# Patient Record
Sex: FEMALE | Race: BLACK OR AFRICAN AMERICAN | Employment: STUDENT | URBAN - METROPOLITAN AREA
[De-identification: names, ages, dates, MRNs, and addresses within clinical notes are randomized per-mention and may not be internally consistent; named-entity substitution may affect disease eponyms.]

---
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
## 2023-01-21 ENCOUNTER — HOSPITAL ENCOUNTER (EMERGENCY)
Facility: HOSPITAL | Age: 18
Discharge: HOME | End: 2023-01-21
Attending: EMERGENCY MEDICINE
Payer: COMMERCIAL

## 2023-01-21 VITALS
WEIGHT: 130 LBS | HEART RATE: 80 BPM | SYSTOLIC BLOOD PRESSURE: 126 MMHG | RESPIRATION RATE: 15 BRPM | BODY MASS INDEX: 22.2 KG/M2 | OXYGEN SATURATION: 100 % | HEIGHT: 64 IN | TEMPERATURE: 98 F | DIASTOLIC BLOOD PRESSURE: 89 MMHG

## 2023-01-21 DIAGNOSIS — J06.9 UPPER RESPIRATORY TRACT INFECTION, UNSPECIFIED TYPE: Primary | ICD-10-CM

## 2023-01-21 LAB
FLUAV + FLUBV RNA SPEC NAA+PROBE: NEGATIVE
FLUAV + FLUBV RNA SPEC NAA+PROBE: NEGATIVE
RSV RNA SPEC NAA+PROBE: NEGATIVE
SARS-COV-2 RNA RESP QL NAA+PROBE: NOT DETECTED

## 2023-01-21 PROCEDURE — 99283 EMERGENCY DEPT VISIT LOW MDM: CPT

## 2023-01-21 PROCEDURE — 0241U SARS-COV-2/FLU A AND B/RSV BY PCR (GENEXPERT): CPT | Performed by: EMERGENCY MEDICINE

## 2023-01-21 NOTE — ED QUICK NOTES
PT reports nasal congestion and fatigue since Wednesday. Denies CP, SOB, KRUEGER, vomiting, nausea, blood in urine, and blood in stool.

## 2023-02-07 ENCOUNTER — OFFICE VISIT (OUTPATIENT)
Dept: FAMILY MEDICINE CLINIC | Facility: CLINIC | Age: 18
End: 2023-02-07

## 2023-02-07 ENCOUNTER — TELEPHONE (OUTPATIENT)
Dept: FAMILY MEDICINE CLINIC | Facility: CLINIC | Age: 18
End: 2023-02-07

## 2023-02-07 ENCOUNTER — LAB ENCOUNTER (OUTPATIENT)
Dept: LAB | Age: 18
End: 2023-02-07
Attending: NURSE PRACTITIONER
Payer: COMMERCIAL

## 2023-02-07 VITALS
SYSTOLIC BLOOD PRESSURE: 122 MMHG | HEIGHT: 64 IN | WEIGHT: 147 LBS | HEART RATE: 99 BPM | BODY MASS INDEX: 25.1 KG/M2 | DIASTOLIC BLOOD PRESSURE: 81 MMHG

## 2023-02-07 DIAGNOSIS — Z34.90 PREGNANCY, UNSPECIFIED GESTATIONAL AGE: Primary | ICD-10-CM

## 2023-02-07 DIAGNOSIS — N92.6 MISSED MENSES: ICD-10-CM

## 2023-02-07 LAB
B-HCG SERPL-ACNC: ABNORMAL MIU/ML
CONTROL LINE PRESENT WITH A CLEAR BACKGROUND (YES/NO): YES YES/NO
KIT LOT #: NORMAL NUMERIC
PREGNANCY TEST, URINE: POSITIVE

## 2023-02-07 PROCEDURE — 84702 CHORIONIC GONADOTROPIN TEST: CPT | Performed by: NURSE PRACTITIONER

## 2023-02-07 PROCEDURE — 3079F DIAST BP 80-89 MM HG: CPT | Performed by: NURSE PRACTITIONER

## 2023-02-07 PROCEDURE — 36415 COLL VENOUS BLD VENIPUNCTURE: CPT | Performed by: NURSE PRACTITIONER

## 2023-02-07 PROCEDURE — 81025 URINE PREGNANCY TEST: CPT | Performed by: NURSE PRACTITIONER

## 2023-02-07 PROCEDURE — 3074F SYST BP LT 130 MM HG: CPT | Performed by: NURSE PRACTITIONER

## 2023-02-07 PROCEDURE — 99214 OFFICE O/P EST MOD 30 MIN: CPT | Performed by: NURSE PRACTITIONER

## 2023-02-07 PROCEDURE — 3008F BODY MASS INDEX DOCD: CPT | Performed by: NURSE PRACTITIONER

## 2023-02-07 NOTE — PATIENT INSTRUCTIONS
New Pregnancy:    Prenatal vitamins-1 tablet once per day (may take at night if nausea/vomitting are a problem)    Avoid alcohol, smoking, second/ smoke, drugs    Avoid raw meats, undercooked meats, raw seafood, unpasteurized dairy products, soft cheeses    Warm deli meats to decrease risk of listeria infection    Do not take any over the counter medications without consulting health care provider first    Report any vaginal spotting or bleeding    Call if any questions or concern

## 2023-02-07 NOTE — TELEPHONE ENCOUNTER
Called and discussed natural remedies with patient and informed that antiemetics are not recommended this early in pregnancy. If symptoms persist, should follow up with ob for recommendations. Understanding verbalized.

## 2023-02-07 NOTE — TELEPHONE ENCOUNTER
Patient was just seen in the office and forgot to ask Richmond Novoa if she can prescribed Rx for nauseas   Please advs

## 2023-02-11 ENCOUNTER — PATIENT MESSAGE (OUTPATIENT)
Dept: FAMILY MEDICINE CLINIC | Facility: CLINIC | Age: 18
End: 2023-02-11

## 2023-02-11 NOTE — TELEPHONE ENCOUNTER
From: Marylou Marin  To: JOHNSON Fajardo  Sent: 2/11/2023 12:08 AM CST  Subject: hcg     good evening.  i was just wondering what did the test results mean for my blood and urine test? or should i schedule another appointment to come in ?

## 2023-02-17 ENCOUNTER — TELEPHONE (OUTPATIENT)
Dept: FAMILY MEDICINE CLINIC | Facility: CLINIC | Age: 18
End: 2023-02-17

## 2023-02-17 NOTE — TELEPHONE ENCOUNTER
Patient contacts clinic reporting recent visit for confirmed pregnancy. States she was not feeling well today and needs a note for work. Appointment noted 2/7, 10 days ago. I advised patient if she is experiencing new symptoms she would need to be seen for evaluation and documentation of work status. She reports chills. Has not checked her temperature. She denies fever, body aches, congestion, sore throat, coughing, wheezing, chest pain or shortness of breath. Advised patient try [facility] or  for evaluation and documentation. She will do so.

## 2023-02-27 ENCOUNTER — HOSPITAL ENCOUNTER (EMERGENCY)
Facility: HOSPITAL | Age: 18
Discharge: HOME | End: 2023-02-27
Attending: EMERGENCY MEDICINE
Payer: COMMERCIAL

## 2023-02-27 VITALS
BODY MASS INDEX: 23.9 KG/M2 | WEIGHT: 140 LBS | HEART RATE: 88 BPM | HEIGHT: 64 IN | TEMPERATURE: 98 F | RESPIRATION RATE: 18 BRPM | DIASTOLIC BLOOD PRESSURE: 72 MMHG | SYSTOLIC BLOOD PRESSURE: 119 MMHG | OXYGEN SATURATION: 99 %

## 2023-02-27 DIAGNOSIS — R10.2 PELVIC PAIN: ICD-10-CM

## 2023-02-27 DIAGNOSIS — O03.9 MISCARRIAGE: Primary | ICD-10-CM

## 2023-02-27 PROCEDURE — 99283 EMERGENCY DEPT VISIT LOW MDM: CPT

## 2023-02-27 PROCEDURE — 99285 EMERGENCY DEPT VISIT HI MDM: CPT

## 2023-02-27 RX ORDER — HYDROCODONE BITARTRATE AND ACETAMINOPHEN 5; 325 MG/1; MG/1
1-2 TABLET ORAL EVERY 6 HOURS PRN
Qty: 10 TABLET | Refills: 0 | Status: SHIPPED | OUTPATIENT
Start: 2023-02-27 | End: 2023-03-04

## 2023-02-27 RX ORDER — HYDROCODONE BITARTRATE AND ACETAMINOPHEN 5; 325 MG/1; MG/1
2 TABLET ORAL ONCE
Status: COMPLETED | OUTPATIENT
Start: 2023-02-27 | End: 2023-02-27

## 2023-02-27 NOTE — ED INITIAL ASSESSMENT (HPI)
Pt presents with lower abdominal pain and vaginal bleeding that got worse since Saturday, pt reports she took an \"\" pill on Tuesday

## 2023-03-04 ENCOUNTER — MED REC SCAN ONLY (OUTPATIENT)
Dept: FAMILY MEDICINE CLINIC | Facility: CLINIC | Age: 18
End: 2023-03-04

## 2023-04-18 ENCOUNTER — PATIENT MESSAGE (OUTPATIENT)
Dept: FAMILY MEDICINE CLINIC | Facility: CLINIC | Age: 18
End: 2023-04-18

## 2023-04-28 ENCOUNTER — HOSPITAL ENCOUNTER (EMERGENCY)
Facility: HOSPITAL | Age: 18
Discharge: HOME | End: 2023-04-28
Attending: STUDENT IN AN ORGANIZED HEALTH CARE EDUCATION/TRAINING PROGRAM
Payer: COMMERCIAL

## 2023-04-28 VITALS
SYSTOLIC BLOOD PRESSURE: 120 MMHG | OXYGEN SATURATION: 98 % | DIASTOLIC BLOOD PRESSURE: 69 MMHG | WEIGHT: 140 LBS | TEMPERATURE: 98 F | HEIGHT: 64 IN | HEART RATE: 65 BPM | RESPIRATION RATE: 18 BRPM | BODY MASS INDEX: 23.9 KG/M2

## 2023-04-28 DIAGNOSIS — M54.9 MILD BACK PAIN: Primary | ICD-10-CM

## 2023-04-28 DIAGNOSIS — G44.209 TENSION HEADACHE: ICD-10-CM

## 2023-04-28 PROCEDURE — 96372 THER/PROPH/DIAG INJ SC/IM: CPT

## 2023-04-28 PROCEDURE — 99283 EMERGENCY DEPT VISIT LOW MDM: CPT

## 2023-04-28 PROCEDURE — 99284 EMERGENCY DEPT VISIT MOD MDM: CPT

## 2023-04-28 RX ORDER — KETOROLAC TROMETHAMINE 15 MG/ML
15 INJECTION, SOLUTION INTRAMUSCULAR; INTRAVENOUS ONCE
Status: COMPLETED | OUTPATIENT
Start: 2023-04-28 | End: 2023-04-28

## 2023-04-28 RX ORDER — IBUPROFEN 600 MG/1
600 TABLET ORAL EVERY 8 HOURS PRN
Qty: 21 TABLET | Refills: 0 | Status: SHIPPED | OUTPATIENT
Start: 2023-04-28 | End: 2023-05-05

## 2023-04-28 RX ORDER — ACETAMINOPHEN 500 MG
1000 TABLET ORAL ONCE
Status: COMPLETED | OUTPATIENT
Start: 2023-04-28 | End: 2023-04-28

## 2023-04-28 NOTE — DISCHARGE INSTRUCTIONS
.Thank you for seeking care at [facility] Emergency Department. You have been seen and evaluated for back pain. We discussed the results of your workup   Please read the instructions provided   If given prescriptions, take as instructed    Remember, your care process does not end after your visit today. Please follow-up with your doctor within 1-2 days for a follow-up check to ensure you are  improving, to see if you need any further evaluation/testing, or to evaluate for any alternate diagnoses. Please return to the emergency department if you develop severe pain that is not controlled by pain medications, loss of control of your legs, if you are unable to walk because of pain or weakness, worsening numbness or weakness, loss of bowel or bladder control (dribbling of urine or having accidents you wouldn't normally have), inability to urinate, numbness of your genital or anal area, fevers, abdominal pain, change in urination, or as these could all be signs of a serious medical emergency. Call or return to the ER for any other new or worsening symptoms.

## 2023-05-13 ENCOUNTER — PATIENT MESSAGE (OUTPATIENT)
Dept: FAMILY MEDICINE CLINIC | Facility: CLINIC | Age: 18
End: 2023-05-13

## 2023-05-13 ENCOUNTER — TELEPHONE (OUTPATIENT)
Dept: FAMILY MEDICINE CLINIC | Facility: CLINIC | Age: 18
End: 2023-05-13

## 2023-05-13 NOTE — TELEPHONE ENCOUNTER
Yelena Feliciano - unsure if you would write a note, please advise. See also 5/13/23 Patient Message \"doctors note\"  Last visit 2/7/23. Duplicate Encounter. See 5/13/23 Patient message \"doctors note\" encounter from today. Another [facility] RN had already responded to patient's request, sent MyChart to patient.

## 2023-06-02 ENCOUNTER — HOSPITAL ENCOUNTER (EMERGENCY)
Facility: HOSPITAL | Age: 18
Discharge: HOME | End: 2023-06-02
Attending: EMERGENCY MEDICINE
Payer: COMMERCIAL

## 2023-06-02 VITALS
OXYGEN SATURATION: 98 % | RESPIRATION RATE: 18 BRPM | HEIGHT: 64 IN | DIASTOLIC BLOOD PRESSURE: 68 MMHG | BODY MASS INDEX: 24.75 KG/M2 | WEIGHT: 145 LBS | TEMPERATURE: 98 F | SYSTOLIC BLOOD PRESSURE: 123 MMHG | HEART RATE: 85 BPM

## 2023-06-02 DIAGNOSIS — J06.9 VIRAL URI WITH COUGH: Primary | ICD-10-CM

## 2023-06-02 LAB — SARS-COV-2 RNA RESP QL NAA+PROBE: NOT DETECTED

## 2023-06-02 PROCEDURE — 99283 EMERGENCY DEPT VISIT LOW MDM: CPT

## 2023-06-02 NOTE — ED INITIAL ASSESSMENT (HPI)
Patient presents with cough/congestion/ loss of taste and smell since Wednesday   Patient notes body aches

## 2023-08-04 ENCOUNTER — OFFICE VISIT (OUTPATIENT)
Dept: FAMILY MEDICINE CLINIC | Facility: CLINIC | Age: 18
End: 2023-08-04

## 2023-08-04 VITALS
DIASTOLIC BLOOD PRESSURE: 68 MMHG | WEIGHT: 150 LBS | HEART RATE: 71 BPM | BODY MASS INDEX: 25.61 KG/M2 | SYSTOLIC BLOOD PRESSURE: 107 MMHG | HEIGHT: 64 IN

## 2023-08-04 DIAGNOSIS — K92.1 BLOOD IN STOOL: Primary | ICD-10-CM

## 2023-08-04 PROCEDURE — 3008F BODY MASS INDEX DOCD: CPT | Performed by: NURSE PRACTITIONER

## 2023-08-04 PROCEDURE — 3074F SYST BP LT 130 MM HG: CPT | Performed by: NURSE PRACTITIONER

## 2023-08-04 PROCEDURE — 99214 OFFICE O/P EST MOD 30 MIN: CPT | Performed by: NURSE PRACTITIONER

## 2023-08-04 PROCEDURE — 3078F DIAST BP <80 MM HG: CPT | Performed by: NURSE PRACTITIONER

## 2023-09-01 PROCEDURE — 99284 EMERGENCY DEPT VISIT MOD MDM: CPT

## 2023-09-01 PROCEDURE — 99283 EMERGENCY DEPT VISIT LOW MDM: CPT

## 2023-09-02 ENCOUNTER — HOSPITAL ENCOUNTER (EMERGENCY)
Facility: HOSPITAL | Age: 18
Discharge: HOME | End: 2023-09-02
Attending: EMERGENCY MEDICINE
Payer: COMMERCIAL

## 2023-09-02 VITALS
HEART RATE: 65 BPM | DIASTOLIC BLOOD PRESSURE: 67 MMHG | TEMPERATURE: 98 F | OXYGEN SATURATION: 99 % | RESPIRATION RATE: 18 BRPM | WEIGHT: 140 LBS | HEIGHT: 64 IN | SYSTOLIC BLOOD PRESSURE: 134 MMHG | BODY MASS INDEX: 23.9 KG/M2

## 2023-09-02 DIAGNOSIS — K92.2 GASTROINTESTINAL HEMORRHAGE, UNSPECIFIED GASTROINTESTINAL HEMORRHAGE TYPE: Primary | ICD-10-CM

## 2023-09-02 LAB — B-HCG UR QL: NEGATIVE

## 2023-09-02 PROCEDURE — 81025 URINE PREGNANCY TEST: CPT

## 2023-09-02 RX ORDER — POLYETHYLENE GLYCOL 3350 17 G/17G
17 POWDER, FOR SOLUTION ORAL DAILY PRN
Qty: 12 EACH | Refills: 0 | Status: SHIPPED | OUTPATIENT
Start: 2023-09-02 | End: 2023-10-02

## 2023-09-02 RX ORDER — DOCUSATE SODIUM 100 MG/1
100 CAPSULE, LIQUID FILLED ORAL 2 TIMES DAILY
Qty: 60 CAPSULE | Refills: 0 | Status: SHIPPED | OUTPATIENT
Start: 2023-09-02 | End: 2023-10-02

## 2023-09-02 NOTE — ED INITIAL ASSESSMENT (HPI)
Pt to ED with c/o blood in stool and mild rectal pain. Pt states Hx of hemorrhoids about 3 years ago.  No abdominal pain or n/v/d.

## 2023-12-12 ENCOUNTER — OFFICE VISIT (OUTPATIENT)
Dept: FAMILY MEDICINE CLINIC | Facility: CLINIC | Age: 18
End: 2023-12-12
Payer: COMMERCIAL

## 2023-12-12 VITALS
WEIGHT: 152 LBS | SYSTOLIC BLOOD PRESSURE: 110 MMHG | DIASTOLIC BLOOD PRESSURE: 71 MMHG | BODY MASS INDEX: 25.95 KG/M2 | HEART RATE: 76 BPM | HEIGHT: 64 IN

## 2023-12-12 DIAGNOSIS — H91.91 HEARING LOSS OF RIGHT EAR, UNSPECIFIED HEARING LOSS TYPE: Primary | ICD-10-CM

## 2023-12-12 PROCEDURE — 99213 OFFICE O/P EST LOW 20 MIN: CPT | Performed by: NURSE PRACTITIONER

## 2023-12-12 PROCEDURE — 3008F BODY MASS INDEX DOCD: CPT | Performed by: NURSE PRACTITIONER

## 2023-12-12 PROCEDURE — 3078F DIAST BP <80 MM HG: CPT | Performed by: NURSE PRACTITIONER

## 2023-12-12 PROCEDURE — 3074F SYST BP LT 130 MM HG: CPT | Performed by: NURSE PRACTITIONER

## 2024-07-25 ENCOUNTER — HOSPITAL ENCOUNTER (EMERGENCY)
Facility: HOSPITAL | Age: 19
Discharge: HOME | End: 2024-07-25
Attending: EMERGENCY MEDICINE
Payer: MEDICAID

## 2024-07-25 ENCOUNTER — APPOINTMENT (OUTPATIENT)
Dept: CT IMAGING | Facility: HOSPITAL | Age: 19
End: 2024-07-25
Attending: EMERGENCY MEDICINE
Payer: MEDICAID

## 2024-07-25 VITALS
SYSTOLIC BLOOD PRESSURE: 118 MMHG | OXYGEN SATURATION: 99 % | HEART RATE: 88 BPM | DIASTOLIC BLOOD PRESSURE: 86 MMHG | BODY MASS INDEX: 26 KG/M2 | WEIGHT: 150 LBS | RESPIRATION RATE: 16 BRPM | TEMPERATURE: 99 F

## 2024-07-25 DIAGNOSIS — N12 PYELONEPHRITIS OF LEFT KIDNEY: Primary | ICD-10-CM

## 2024-07-25 LAB
ANION GAP SERPL CALC-SCNC: 9 MMOL/L (ref 0–18)
ATRIAL RATE: 88 BPM
B-HCG SERPL-ACNC: <2.6 MIU/ML
B-HCG UR QL: NEGATIVE
BASOPHILS # BLD AUTO: 0.03 X10(3) UL (ref 0–0.2)
BASOPHILS NFR BLD AUTO: 0.2 %
BILIRUB UR QL: NEGATIVE
BUN BLD-MCNC: 9 MG/DL (ref 9–23)
BUN/CREAT SERPL: 9.5 (ref 10–20)
CALCIUM BLD-MCNC: 9.8 MG/DL (ref 8.7–10.4)
CHLORIDE SERPL-SCNC: 107 MMOL/L (ref 98–112)
CO2 SERPL-SCNC: 25 MMOL/L (ref 21–32)
COLOR UR: YELLOW
CREAT BLD-MCNC: 0.95 MG/DL
DEPRECATED RDW RBC AUTO: 44.3 FL (ref 35.1–46.3)
EGFRCR SERPLBLD CKD-EPI 2021: 87 ML/MIN/1.73M2 (ref 60–?)
EOSINOPHIL # BLD AUTO: 0.09 X10(3) UL (ref 0–0.7)
EOSINOPHIL NFR BLD AUTO: 0.7 %
ERYTHROCYTE [DISTWIDTH] IN BLOOD BY AUTOMATED COUNT: 13.2 % (ref 11–15)
FLUAV + FLUBV RNA SPEC NAA+PROBE: NEGATIVE
FLUAV + FLUBV RNA SPEC NAA+PROBE: NEGATIVE
GLUCOSE BLD-MCNC: 88 MG/DL (ref 70–99)
GLUCOSE UR-MCNC: NEGATIVE MG/DL
HCT VFR BLD AUTO: 42.1 %
HGB BLD-MCNC: 13.7 G/DL
IMM GRANULOCYTES # BLD AUTO: 0.03 X10(3) UL (ref 0–1)
IMM GRANULOCYTES NFR BLD: 0.2 %
KETONES UR-MCNC: NEGATIVE MG/DL
LYMPHOCYTES # BLD AUTO: 1.06 X10(3) UL (ref 1–4)
LYMPHOCYTES NFR BLD AUTO: 8.8 %
MCH RBC QN AUTO: 29.6 PG (ref 26–34)
MCHC RBC AUTO-ENTMCNC: 32.5 G/DL (ref 31–37)
MCV RBC AUTO: 90.9 FL
MONOCYTES # BLD AUTO: 0.1 X10(3) UL (ref 0.1–1)
MONOCYTES NFR BLD AUTO: 0.8 %
NEUTROPHILS # BLD AUTO: 10.75 X10 (3) UL (ref 1.5–7.7)
NEUTROPHILS # BLD AUTO: 10.75 X10(3) UL (ref 1.5–7.7)
NEUTROPHILS NFR BLD AUTO: 89.3 %
NITRITE UR QL STRIP.AUTO: NEGATIVE
OSMOLALITY SERPL CALC.SUM OF ELEC: 290 MOSM/KG (ref 275–295)
P AXIS: 40 DEGREES
P-R INTERVAL: 122 MS
PH UR: 6 [PH] (ref 5–8)
PLATELET # BLD AUTO: 294 10(3)UL (ref 150–450)
POTASSIUM SERPL-SCNC: 3.6 MMOL/L (ref 3.5–5.1)
Q-T INTERVAL: 364 MS
QRS DURATION: 100 MS
QTC CALCULATION (BEZET): 440 MS
R AXIS: 91 DEGREES
RBC # BLD AUTO: 4.63 X10(6)UL
RBC #/AREA URNS AUTO: >10 /HPF
RBC #/AREA URNS AUTO: >10 /HPF
RSV RNA SPEC NAA+PROBE: NEGATIVE
SARS-COV-2 RNA RESP QL NAA+PROBE: NOT DETECTED
SODIUM SERPL-SCNC: 141 MMOL/L (ref 136–145)
SP GR UR STRIP: 1.01 (ref 1–1.03)
T AXIS: 45 DEGREES
UROBILINOGEN UR STRIP-ACNC: 0.2
VENTRICULAR RATE: 88 BPM
WBC # BLD AUTO: 12.1 X10(3) UL (ref 4–11)
WBC #/AREA URNS AUTO: >50 /HPF
WBC #/AREA URNS AUTO: >50 /HPF
WBC CLUMPS UR QL AUTO: PRESENT /HPF
WBC CLUMPS UR QL AUTO: PRESENT /HPF

## 2024-07-25 PROCEDURE — 96375 TX/PRO/DX INJ NEW DRUG ADDON: CPT

## 2024-07-25 PROCEDURE — 87086 URINE CULTURE/COLONY COUNT: CPT | Performed by: EMERGENCY MEDICINE

## 2024-07-25 PROCEDURE — 84702 CHORIONIC GONADOTROPIN TEST: CPT | Performed by: EMERGENCY MEDICINE

## 2024-07-25 PROCEDURE — 85025 COMPLETE CBC W/AUTO DIFF WBC: CPT | Performed by: EMERGENCY MEDICINE

## 2024-07-25 PROCEDURE — 87088 URINE BACTERIA CULTURE: CPT | Performed by: EMERGENCY MEDICINE

## 2024-07-25 PROCEDURE — 99285 EMERGENCY DEPT VISIT HI MDM: CPT

## 2024-07-25 PROCEDURE — 99284 EMERGENCY DEPT VISIT MOD MDM: CPT

## 2024-07-25 PROCEDURE — 74176 CT ABD & PELVIS W/O CONTRAST: CPT | Performed by: EMERGENCY MEDICINE

## 2024-07-25 PROCEDURE — 81001 URINALYSIS AUTO W/SCOPE: CPT | Performed by: EMERGENCY MEDICINE

## 2024-07-25 PROCEDURE — 93010 ELECTROCARDIOGRAM REPORT: CPT

## 2024-07-25 PROCEDURE — 0241U SARS-COV-2/FLU A AND B/RSV BY PCR (GENEXPERT): CPT | Performed by: EMERGENCY MEDICINE

## 2024-07-25 PROCEDURE — 81025 URINE PREGNANCY TEST: CPT

## 2024-07-25 PROCEDURE — 80048 BASIC METABOLIC PNL TOTAL CA: CPT | Performed by: EMERGENCY MEDICINE

## 2024-07-25 PROCEDURE — 96361 HYDRATE IV INFUSION ADD-ON: CPT

## 2024-07-25 PROCEDURE — 96374 THER/PROPH/DIAG INJ IV PUSH: CPT

## 2024-07-25 PROCEDURE — 87186 SC STD MICRODIL/AGAR DIL: CPT | Performed by: EMERGENCY MEDICINE

## 2024-07-25 PROCEDURE — 93005 ELECTROCARDIOGRAM TRACING: CPT

## 2024-07-25 RX ORDER — ONDANSETRON 2 MG/ML
INJECTION INTRAMUSCULAR; INTRAVENOUS
Status: COMPLETED
Start: 2024-07-25 | End: 2024-07-25

## 2024-07-25 RX ORDER — CEPHALEXIN 500 MG/1
500 CAPSULE ORAL 4 TIMES DAILY
Qty: 28 CAPSULE | Refills: 0 | Status: SHIPPED | OUTPATIENT
Start: 2024-07-25 | End: 2024-08-01

## 2024-07-25 RX ORDER — AMOXICILLIN AND CLAVULANATE POTASSIUM 875; 125 MG/1; MG/1
875 TABLET, FILM COATED ORAL ONCE
Status: COMPLETED | OUTPATIENT
Start: 2024-07-25 | End: 2024-07-25

## 2024-07-25 RX ORDER — ONDANSETRON 2 MG/ML
4 INJECTION INTRAMUSCULAR; INTRAVENOUS ONCE
Status: COMPLETED | OUTPATIENT
Start: 2024-07-25 | End: 2024-07-25

## 2024-07-25 RX ORDER — ACETAMINOPHEN 500 MG
1000 TABLET ORAL ONCE
Status: COMPLETED | OUTPATIENT
Start: 2024-07-25 | End: 2024-07-25

## 2024-07-25 RX ORDER — ONDANSETRON 8 MG/1
8 TABLET, ORALLY DISINTEGRATING ORAL EVERY 4 HOURS PRN
Qty: 6 TABLET | Refills: 0 | Status: SHIPPED | OUTPATIENT
Start: 2024-07-25

## 2024-07-25 RX ORDER — KETOROLAC TROMETHAMINE 15 MG/ML
15 INJECTION, SOLUTION INTRAMUSCULAR; INTRAVENOUS ONCE
Status: COMPLETED | OUTPATIENT
Start: 2024-07-25 | End: 2024-07-25

## 2024-07-25 NOTE — ED INITIAL ASSESSMENT (HPI)
Patient arrives to the ER complaining of lower back pain that started a couple days ago. No dysuria. +chills, aches, and flu-like symptoms. Patient also endorses localized head pain.

## 2024-07-25 NOTE — ED QUICK NOTES
Rounding Completed    Plan of Care reviewed. Waiting for results/disposition.            7/25/2024     6:19 AM 7/25/2024     8:28 AM   Vitals History   /89 118/86   Pulse 90 88   Resp 18 16   Temp 98.6 °F (37 °C)    SpO2 99 % 99 %   Weight 150 lbs    BMI 25.75 kg/m2          Bed is locked and in lowest position. Call light within reach.

## 2024-07-25 NOTE — ED PROVIDER NOTES
Patient Seen in: [facility] Emergency Department    History     Chief Complaint   Patient presents with    Low Back Pain       HPI    19-year-old female who presents to the emergency department given left flank pain for the last 3 days associated with chills, dysuria.  No fevers.  Mild nausea.  No emesis or chest pain or dyspnea.  Denies any recent immobilization, surgery, unilateral lower extremity edema, known cancer history, or recent travel. No hx of DVT or PE.       History reviewed.   Past Medical History:    Recurrent UTI       History reviewed. History reviewed. No pertinent surgical history.      Medications :  (Not in a hospital admission)       History reviewed. No pertinent family history.    Smoking Status:   Social History     Socioeconomic History    Marital status: Single   Tobacco Use    Smoking status: Never    Smokeless tobacco: Never   Vaping Use    Vaping status: Never Used   Substance and Sexual Activity    Alcohol use: Never    Drug use: Yes     Types: Cannabis       Constitutional and vital signs reviewed.      Social History and Family History elements reviewed from today, pertinent positives to the presenting problem noted.    Physical Exam     ED Triage Vitals   BP 07/25/24 0619 122/89   Pulse 07/25/24 0619 90   Resp 07/25/24 0619 18   Temp 07/25/24 0619 98.6 °F (37 °C)   Temp src --    SpO2 07/25/24 0619 99 %   O2 Device 07/25/24 0828 None (Room air)       All measures to prevent infection transmission during my interaction with the patient were taken. The patient was already wearing a droplet mask on my arrival to the room. Personal protective equipment was worn throughout the duration of the exam.  Handwashing was performed prior to and after the exam.  Stethoscope and any equipment used during my examination was cleaned with super sani-cloth germicidal wipes following the exam.     Physical Exam    General: NAD  Head: Normocephalic and atraumatic.  Mouth/Throat/Ears/Nose:  Oropharynx is clear   Eyes: Conjunctivae and EOM are normal.   Neck: Normal range of motion. Supple.   Cardiovascular: Normal rate, regular rhythm, normal heart sounds.  Respiratory/Chest: Clear and equal bilaterally. Exhibits no tenderness.  Gastrointestinal: Soft, L CVA ttp, non-distended. Bowel sounds are normal.   Musculoskeletal:No swelling or deformity.   Neurological: Alert and appropriate. No focal deficits.   Skin: Skin is warm and dry. No pallor.  Psychiatric: Has a normal mood and affect.      ED Course        Labs Reviewed   BASIC METABOLIC PANEL (8) - Abnormal; Notable for the following components:       Result Value    BUN/CREA Ratio 9.5 (*)     All other components within normal limits   URINALYSIS WITH CULTURE REFLEX - Abnormal; Notable for the following components:    Clarity Urine Cloudy (*)     Blood Urine Large (*)     Protein Urine 30 mg/dL (*)     Leukocyte Esterase Urine Large (*)     WBC Urine >50 (*)     RBC Urine >10 (*)     Bacteria Urine 2+ (*)     Squamous Epi. Cells Few (*)     WBC Clump Present (*)     All other components within normal limits    Narrative:     Urine Culture Recommended   UA MICROSCOPIC ONLY, URINE - Abnormal; Notable for the following components:    WBC Urine >50 (*)     RBC Urine >10 (*)     Bacteria Urine 2+ (*)     Squamous Epi. Cells Few (*)     WBC Clump Present (*)     All other components within normal limits   CBC W/ DIFFERENTIAL - Abnormal; Notable for the following components:    WBC 12.1 (*)     Neutrophil Absolute Prelim 10.75 (*)     Neutrophil Absolute 10.75 (*)     All other components within normal limits   HCG, BETA SUBUNIT (QUANT PREGNANCY TEST) - Normal   POCT PREGNANCY URINE - Normal   SARS-COV-2/FLU A AND B/RSV BY PCR (GENEXPERT) - Normal    Narrative:     This test is intended for the qualitative detection and differentiation of SARS-CoV-2, influenza A, influenza B, and respiratory syncytial virus (RSV) viral RNA in nasopharyngeal or nares swabs  from individuals suspected of respiratory viral infection consistent with COVID-19 by their healthcare provider. Signs and symptoms of respiratory viral infection due to SARS-CoV-2, influenza, and RSV can be similar.                                    Test performed using the Xpert Xpress SARS-CoV-2/FLU/RSV (real time RT-PCR)  assay on the GeneXpert instrument, [employer], [place] [address].                   This test is being used under the Food and Drug Administration's Emergency Use Authorization.                                    The authorized Fact Sheet for Healthcare Providers for this assay is available upon request from the laboratory.   CBC WITH DIFFERENTIAL WITH PLATELET    Narrative:     The following orders were created for panel order CBC With Differential With Platelet.                  Procedure                               Abnormality         Status                                     ---------                               -----------         ------                                     CBC W/ DIFFERENTIAL[[id number]]          Abnormal            Final result                                                 Please view results for these tests on the individual orders.   URINE CULTURE, ROUTINE     EKG    Rate, intervals and axes as noted on EKG Report.  Rate: 88  Rhythm: Sinus Rhythm  Reading: No STEMI.  This is my interpretation.           As Interpreted by me    Imaging Results Available and Reviewed while in ED: CT ABDOMEN+PELVIS(CPT=74176)    Result Date: 7/25/2024  CONCLUSION:   No obstructing renal stone or hydroureteronephrosis.  Large amount of stool throughout the colon, suggesting constipation.  Small amount free fluid within the pelvis, which can be a physiologic finding in a patient of this age.    Dictated by (CST): Kathie Larsen MD on 7/25/2024 at 8:20 AM     Finalized by (CST): Kathie Larsen MD on 7/25/2024 at 8:24 AM         ED Medications Administered:   Medications   sodium  chloride 0.9 % IV bolus 1,000 mL (0 mL Intravenous Stopped 7/25/24 0832)   acetaminophen (Tylenol Extra Strength) tab 1,000 mg (1,000 mg Oral Given by Other 7/25/24 0700)   ondansetron (Zofran) 4 MG/2ML injection 4 mg (4 mg Intravenous Given 7/25/24 0729)   ketorolac (Toradol) 15 MG/ML injection 15 mg (15 mg Intravenous Given 7/25/24 0825)   amoxicillin clavulanate (Augmentin) 875-125 MG per tab 875 mg (875 mg Oral Given 7/25/24 0825)         MDM     Vitals:    07/25/24 0619 07/25/24 0828   BP: 122/89 118/86   Pulse: 90 88   Resp: 18 16   Temp: 98.6 °F (37 °C)    SpO2: 99% 99%   Weight: 68 kg      *I personally reviewed and interpreted all ED vitals.    Pulse Ox: 99%, Room air, Normal     Monitor Interpretation:   normal sinus rhythm as interpreted by me.  The cardiac monitor was ordered given the left flank pain.      Medical Decision Making      Differential Diagnosis/ Diagnostic Considerations: Pyelonephritis, urinary tract infection, renal calculus, COVID-19    Complicating Factors: The patient already has does not have any pertinent problems on file. to contribute to the complexity of this ED evaluation.    I reviewed prior chart records including ED visit note from September 2, 2023.  Patient is here with left flank pain, concerning for pyelonephritis.  Labs reviewed, unremarkable for acute findings aside from urinary tract infection.  Given significant microscopic hematuria, CT scan was obtained to assess for renal calculus which was unremarkable for calculus on my interpretation.  She is tolerating p.o. after supportive care, first dose of antibiotics provided in the emergency department and a prescription of antibiotics also provided.    Discharge in stable condition.  Patient is comfortable with the plan.    Prescriptions: Cephalexin, Zofran      Disposition and Plan     Clinical Impression:  1. Pyelonephritis of left kidney        Disposition:  Discharge    Follow-up:  Nonstaff, Physician    Follow  up      Rob Siegel MD  [address] [facility] [address]  [phone]    Schedule an appointment as soon as possible for a visit in 1 day(s)        Medications Prescribed:  Discharge Medication List as of 7/25/2024  8:34 AM        START taking these medications    Details   cephalexin 500 MG Oral Cap Take 1 capsule (500 mg total) by mouth 4 (four) times daily for 7 days., Normal, Disp-28 capsule, R-0      ondansetron 8 MG Oral Tablet Dispersible Take 1 tablet (8 mg total) by mouth every 4 (four) hours as needed for Nausea., Normal, Disp-6 tablet, R-0

## 2024-07-26 ENCOUNTER — HOSPITAL ENCOUNTER (INPATIENT)
Facility: HOSPITAL | Age: 19
LOS: 1 days | Discharge: HOME | End: 2024-07-28
Attending: EMERGENCY MEDICINE | Admitting: STUDENT IN AN ORGANIZED HEALTH CARE EDUCATION/TRAINING PROGRAM
Payer: MEDICAID

## 2024-07-26 DIAGNOSIS — N12 PYELONEPHRITIS: Primary | ICD-10-CM

## 2024-07-26 LAB
ANION GAP SERPL CALC-SCNC: 7 MMOL/L (ref 0–18)
BASOPHILS # BLD AUTO: 0.03 X10(3) UL (ref 0–0.2)
BASOPHILS NFR BLD AUTO: 0.2 %
BUN BLD-MCNC: 7 MG/DL (ref 9–23)
BUN/CREAT SERPL: 7.7 (ref 10–20)
CALCIUM BLD-MCNC: 9.8 MG/DL (ref 8.7–10.4)
CHLORIDE SERPL-SCNC: 105 MMOL/L (ref 98–112)
CO2 SERPL-SCNC: 26 MMOL/L (ref 21–32)
CREAT BLD-MCNC: 0.91 MG/DL
D DIMER PPP FEU-MCNC: 0.44 UG/ML FEU (ref ?–0.5)
DEPRECATED RDW RBC AUTO: 43 FL (ref 35.1–46.3)
EGFRCR SERPLBLD CKD-EPI 2021: 92 ML/MIN/1.73M2 (ref 60–?)
EOSINOPHIL # BLD AUTO: 0.01 X10(3) UL (ref 0–0.7)
EOSINOPHIL NFR BLD AUTO: 0.1 %
ERYTHROCYTE [DISTWIDTH] IN BLOOD BY AUTOMATED COUNT: 13.1 % (ref 11–15)
GLUCOSE BLD-MCNC: 99 MG/DL (ref 70–99)
HCT VFR BLD AUTO: 41.2 %
HGB BLD-MCNC: 13.5 G/DL
IMM GRANULOCYTES # BLD AUTO: 0.04 X10(3) UL (ref 0–1)
IMM GRANULOCYTES NFR BLD: 0.3 %
LACTATE SERPL-SCNC: 1.3 MMOL/L (ref 0.5–2)
LYMPHOCYTES # BLD AUTO: 1.3 X10(3) UL (ref 1–4)
LYMPHOCYTES NFR BLD AUTO: 8.8 %
MCH RBC QN AUTO: 29.6 PG (ref 26–34)
MCHC RBC AUTO-ENTMCNC: 32.8 G/DL (ref 31–37)
MCV RBC AUTO: 90.4 FL
MONOCYTES # BLD AUTO: 1.17 X10(3) UL (ref 0.1–1)
MONOCYTES NFR BLD AUTO: 7.9 %
NEUTROPHILS # BLD AUTO: 12.29 X10 (3) UL (ref 1.5–7.7)
NEUTROPHILS # BLD AUTO: 12.29 X10(3) UL (ref 1.5–7.7)
NEUTROPHILS NFR BLD AUTO: 82.7 %
OSMOLALITY SERPL CALC.SUM OF ELEC: 284 MOSM/KG (ref 275–295)
PLATELET # BLD AUTO: 293 10(3)UL (ref 150–450)
POTASSIUM SERPL-SCNC: 3.3 MMOL/L (ref 3.5–5.1)
RBC # BLD AUTO: 4.56 X10(6)UL
SODIUM SERPL-SCNC: 138 MMOL/L (ref 136–145)
WBC # BLD AUTO: 14.8 X10(3) UL (ref 4–11)

## 2024-07-26 PROCEDURE — 99223 1ST HOSP IP/OBS HIGH 75: CPT | Performed by: HOSPITALIST

## 2024-07-26 RX ORDER — KETOROLAC TROMETHAMINE 15 MG/ML
15 INJECTION, SOLUTION INTRAMUSCULAR; INTRAVENOUS ONCE
Status: COMPLETED | OUTPATIENT
Start: 2024-07-26 | End: 2024-07-26

## 2024-07-26 RX ORDER — ONDANSETRON 2 MG/ML
4 INJECTION INTRAMUSCULAR; INTRAVENOUS ONCE
Status: COMPLETED | OUTPATIENT
Start: 2024-07-26 | End: 2024-07-26

## 2024-07-27 LAB
ANION GAP SERPL CALC-SCNC: 6 MMOL/L (ref 0–18)
BASOPHILS # BLD AUTO: 0.03 X10(3) UL (ref 0–0.2)
BASOPHILS NFR BLD AUTO: 0.2 %
BUN BLD-MCNC: 6 MG/DL (ref 9–23)
BUN/CREAT SERPL: 9 (ref 10–20)
CALCIUM BLD-MCNC: 8.1 MG/DL (ref 8.7–10.4)
CHLORIDE SERPL-SCNC: 112 MMOL/L (ref 98–112)
CO2 SERPL-SCNC: 22 MMOL/L (ref 21–32)
CREAT BLD-MCNC: 0.67 MG/DL
DEPRECATED RDW RBC AUTO: 42.9 FL (ref 35.1–46.3)
EGFRCR SERPLBLD CKD-EPI 2021: 127 ML/MIN/1.73M2 (ref 60–?)
EOSINOPHIL # BLD AUTO: 0.01 X10(3) UL (ref 0–0.7)
EOSINOPHIL NFR BLD AUTO: 0.1 %
ERYTHROCYTE [DISTWIDTH] IN BLOOD BY AUTOMATED COUNT: 13 % (ref 11–15)
GLUCOSE BLD-MCNC: 92 MG/DL (ref 70–99)
HCT VFR BLD AUTO: 32.3 %
HGB BLD-MCNC: 10.6 G/DL
IMM GRANULOCYTES # BLD AUTO: 0.05 X10(3) UL (ref 0–1)
IMM GRANULOCYTES NFR BLD: 0.4 %
LYMPHOCYTES # BLD AUTO: 1.27 X10(3) UL (ref 1–4)
LYMPHOCYTES NFR BLD AUTO: 10 %
MCH RBC QN AUTO: 29.5 PG (ref 26–34)
MCHC RBC AUTO-ENTMCNC: 32.8 G/DL (ref 31–37)
MCV RBC AUTO: 90 FL
MONOCYTES # BLD AUTO: 1.11 X10(3) UL (ref 0.1–1)
MONOCYTES NFR BLD AUTO: 8.8 %
NEUTROPHILS # BLD AUTO: 10.19 X10 (3) UL (ref 1.5–7.7)
NEUTROPHILS # BLD AUTO: 10.19 X10(3) UL (ref 1.5–7.7)
NEUTROPHILS NFR BLD AUTO: 80.5 %
OSMOLALITY SERPL CALC.SUM OF ELEC: 287 MOSM/KG (ref 275–295)
PLATELET # BLD AUTO: 236 10(3)UL (ref 150–450)
POTASSIUM SERPL-SCNC: 3.7 MMOL/L (ref 3.5–5.1)
PROCALCITONIN SERPL-MCNC: 0.54 NG/ML (ref ?–0.05)
RBC # BLD AUTO: 3.59 X10(6)UL
SODIUM SERPL-SCNC: 140 MMOL/L (ref 136–145)
WBC # BLD AUTO: 12.7 X10(3) UL (ref 4–11)

## 2024-07-27 RX ORDER — ACETAMINOPHEN 325 MG/1
650 TABLET ORAL EVERY 6 HOURS PRN
Status: DISCONTINUED | OUTPATIENT
Start: 2024-07-27 | End: 2024-07-28

## 2024-07-27 RX ORDER — ZOLPIDEM TARTRATE 5 MG/1
5 TABLET ORAL NIGHTLY PRN
Status: DISCONTINUED | OUTPATIENT
Start: 2024-07-27 | End: 2024-07-28

## 2024-07-27 RX ORDER — KETOROLAC TROMETHAMINE 15 MG/ML
15 INJECTION, SOLUTION INTRAMUSCULAR; INTRAVENOUS EVERY 6 HOURS PRN
Status: DISCONTINUED | OUTPATIENT
Start: 2024-07-27 | End: 2024-07-28

## 2024-07-27 RX ORDER — MAGNESIUM HYDROXIDE/ALUMINUM HYDROXICE/SIMETHICONE 120; 1200; 1200 MG/30ML; MG/30ML; MG/30ML
30 SUSPENSION ORAL 4 TIMES DAILY PRN
Status: DISCONTINUED | OUTPATIENT
Start: 2024-07-27 | End: 2024-07-28

## 2024-07-27 RX ORDER — ONDANSETRON 2 MG/ML
4 INJECTION INTRAMUSCULAR; INTRAVENOUS EVERY 6 HOURS PRN
Status: DISCONTINUED | OUTPATIENT
Start: 2024-07-27 | End: 2024-07-28

## 2024-07-27 RX ORDER — SODIUM CHLORIDE AND POTASSIUM CHLORIDE 150; 900 MG/100ML; MG/100ML
INJECTION, SOLUTION INTRAVENOUS CONTINUOUS
Status: DISCONTINUED | OUTPATIENT
Start: 2024-07-27 | End: 2024-07-28

## 2024-07-27 RX ORDER — METOCLOPRAMIDE HYDROCHLORIDE 5 MG/ML
10 INJECTION INTRAMUSCULAR; INTRAVENOUS EVERY 6 HOURS PRN
Status: DISCONTINUED | OUTPATIENT
Start: 2024-07-27 | End: 2024-07-28

## 2024-07-27 RX ORDER — HEPARIN SODIUM 5000 [USP'U]/ML
5000 INJECTION, SOLUTION INTRAVENOUS; SUBCUTANEOUS EVERY 12 HOURS SCHEDULED
Status: DISCONTINUED | OUTPATIENT
Start: 2024-07-27 | End: 2024-07-28

## 2024-07-27 NOTE — H&P
[facility]  part of [facility]    History & Physical    Alysa Espino Patient Status:  Emergency    2005 MRN [id number]   Location [facility] EMERGENCY DEPARTMENT Attending Perico Villa MD   Hosp Day # 0 PCP PHYSICIAN NONSTAFF     Date:  2024  Date of Admission:  2024    Chief Complaint:  Chief Complaint   Patient presents with    Fever       History of Present Illness:  Alysa Espino is a(n) 19 year old female, with no significant past medical history was seen earlier in the ER complaining of left flank pain nausea vomiting and fevers, was diagnosed with pyelonephritis started on oral antibiotics and discharged however returns today with persisting pain, intractable nausea vomiting unable to tolerate oral meds.  Denies any dysuria, hematuria suprapubic discomfort hesitancy or urgency.    History:  Past Medical History:    Recurrent UTI     History reviewed. No pertinent surgical history.  Family history: No sick contacts   reports that she has never smoked. She has never used smokeless tobacco. She reports current drug use. Drug: Cannabis. She reports that she does not drink alcohol.    Allergies:  Allergies   Allergen Reactions    Dog Fennel Allergy Skin Test ITCHING       Home Medications:  Prior to Admission Medications   Prescriptions Last Dose Informant Patient Reported? Taking?   cephalexin 500 MG Oral Cap   No No   Sig: Take 1 capsule (500 mg total) by mouth 4 (four) times daily for 7 days.   ondansetron 8 MG Oral Tablet Dispersible   No No   Sig: Take 1 tablet (8 mg total) by mouth every 4 (four) hours as needed for Nausea.      Facility-Administered Medications: None       Review of Systems:  Constitutional:  Weakness, Fatigue.  Eye:  Negative.  Ear/Nose/Mouth/Throat:  Negative.  Respiratory:  Negative  Cardiovascular: Negative  Gastrointestinal: Nausea vomiting  Genitourinary: Left flank pain  Endocrine:  Negative.  Immunologic:   Negative.  Musculoskeletal:  Negative.  Integumentary:  Negative.  Neurologic:  Negative.  Psychiatric:  Negative.  ROS reviewed as documented in chart    Physical Exam:  Temp:  [98.2 °F (36.8 °C)-100.5 °F (38.1 °C)] 98.2 °F (36.8 °C)  Pulse:  [] 81  Resp:  [14-22] 16  BP: (107-124)/(69-73) 124/73  SpO2:  [97 %-99 %] 98 %    General:  Alert and oriented.  Diffuse skin problem:  None.  Eye:  Pupils are equal, round and reactive to light, extraocular movements are intact, Normal conjunctiva.  HENT:  Normocephalic, oral mucosa is moist.  Head:  Normocephalic, atraumatic.  Neck:  Supple, non-tender, no carotid bruit, no jugular venous distention, no lymphadenopathy, no thyromegaly.  Respiratory:  Lungs are clear to auscultation, respirations are non-labored, breath sounds are equal, symmetrical chest wall expansion.  Cardiovascular:  Normal rate, regular rhythm, no murmur, no edema.  Gastrointestinal:  Soft, non-tender, non-distended, normal bowel sounds, no organomegaly.  Lymphatics:  No lymphadenopathy neck, axilla, groin.  Musculoskeletal: Normal range of motion.  normal strength.  Feet:  Normal pulses.  Neurologic:  Alert, oriented, no focal deficits, cranial nerves II-XII are grossly intact.  Cognition and Speech:  Oriented, speech clear and coherent.  Psychiatric:  Cooperative, appropriate mood & affect.      Laboratory Data:   Lab Results   Component Value Date    WBC 14.8 07/26/2024    HGB 13.5 07/26/2024    HCT 41.2 07/26/2024    .0 07/26/2024    CREATSERUM 0.91 07/26/2024    BUN 7 07/26/2024     07/26/2024    K 3.3 07/26/2024     07/26/2024    CO2 26.0 07/26/2024    GLU 99 07/26/2024    CA 9.8 07/26/2024    DDIMER 0.44 07/26/2024       Imaging:  CT ABDOMEN+PELVIS(CPT=74176)    Result Date: 7/25/2024  CONCLUSION:   No obstructing renal stone or hydroureteronephrosis.  Large amount of stool throughout the colon, suggesting constipation.  Small amount free fluid within the pelvis, which  can be a physiologic finding in a patient of this age.    Dictated by (CST): Kathie Larsen MD on 7/25/2024 at 8:20 AM     Finalized by (CST): Kathie Larsen MD on 7/25/2024 at 8:24 AM            Assessment and Plan:    Acute left pyonephritis complicated by sepsis  Patient started on Rocephin 1 g IVPB every 24 hours, earlier urine cultures indicate E. coli, will continue to monitor closely repeat cultures pending.  Sepsis protocol initiated    Intractable nausea vomiting  Pyelonephritis versus cannabis related, will use Zofran as needed, continue IV fluids, will attempt dietary challenge in a.m.    Hypokalemia  Likely secondary to repeated emesis, IV fluids initiated will supplement potassium.  Initiate electrolyte replacement protocol.    Prophylaxis  Subcutaneous heparin    CODE STATUS  Full    Primary care physician  PHYSICIAN NONSTAFF    MDM: High, acute illness/severe exacerbation of chronic illness posing threat to life.  IV medications requiring close inpatient monitoring  60 minutes spent on this admission - examining patient, obtaining history, reviewing previous medical records, going over test results/imaging and discussing plan of care. All questions answered.     Disposition  Clinical course will dictate outcome      CAS MENEZES MD  7/26/2024  11:28 PM

## 2024-07-27 NOTE — PLAN OF CARE
Problem: Patient Centered Care  Goal: Patient preferences are identified and integrated in the patient's plan of care  Description: Interventions:  - What would you like us to know as we care for you? From home with family  - Provide timely, complete, and accurate information to patient/family  - Incorporate patient and family knowledge, values, beliefs, and cultural backgrounds into the planning and delivery of care  - Encourage patient/family to participate in care and decision-making at the level they choose  - Honor patient and family perspectives and choices  Outcome: Progressing     Problem: Patient/Family Goals  Goal: Patient/Family Long Term Goal  Description: Patient's Long Term Goal: To go home    Interventions:  - Monitor vital signs and labs  - Pain management as needed  - Diagnostics per order  - Follow MD orders  - Administer medications as ordered  - Assists with activities of daily living   - Update patient and family on plan of care  - Discharge planning  - See additional Care Plan goals for specific interventions  Outcome: Progressing  Goal: Patient/Family Short Term Goal  Description: Patient's Short Term Goal: To get better    Interventions:   - Monitor vital signs and labs  - Pain management as needed  - Diagnostics per order  - Follow MD orders  - Administer medications as ordered  - Assists with activities of daily living   - Update patient and family on plan of care  - Discharge planning  - See additional Care Plan goals for specific interventions  Outcome: Progressing     Problem: RESPIRATORY - ADULT  Goal: Achieves optimal ventilation and oxygenation  Description: INTERVENTIONS:  - Assess for changes in respiratory status  - Assess for changes in mentation and behavior  - Position to facilitate oxygenation and minimize respiratory effort  - Oxygen supplementation based on oxygen saturation or ABGs  - Provide Smoking Cessation handout, if applicable  - Encourage broncho-pulmonary hygiene  including cough, deep breathe, Incentive Spirometry  - Assess the need for suctioning and perform as needed  - Assess and instruct to report SOB or any respiratory difficulty  - Respiratory Therapy support as indicated  - Manage/alleviate anxiety  - Monitor for signs/symptoms of CO2 retention  Outcome: Progressing     Problem: GASTROINTESTINAL - ADULT  Goal: Minimal or absence of nausea and vomiting  Description: INTERVENTIONS:  - Maintain adequate hydration with IV or PO as ordered and tolerated  - Nasogastric tube to low intermittent suction as ordered  - Evaluate effectiveness of ordered antiemetic medications  - Provide nonpharmacologic comfort measures as appropriate  - Advance diet as tolerated, if ordered  - Obtain nutritional consult as needed  - Evaluate fluid balance  Outcome: Progressing  Goal: Maintains or returns to baseline bowel function  Description: INTERVENTIONS:  - Assess bowel function  - Maintain adequate hydration with IV or PO as ordered and tolerated  - Evaluate effectiveness of GI medications  - Encourage mobilization and activity  - Obtain nutritional consult as needed  - Establish a toileting routine/schedule  - Consider collaborating with pharmacy to review patient's medication profile  Outcome: Progressing  Goal: Maintains adequate nutritional intake (undernourished)  Description: INTERVENTIONS:  - Monitor percentage of each meal consumed  - Identify factors contributing to decreased intake, treat as appropriate  - Assist with meals as needed  - Monitor I&O, WT and lab values  - Obtain nutritional consult as needed  - Optimize oral hygiene and moisture  - Encourage food from home; allow for food preferences  - Enhance eating environment  Outcome: Progressing     Problem: METABOLIC/FLUID AND ELECTROLYTES - ADULT  Goal: Electrolytes maintained within normal limits  Description: INTERVENTIONS:  - Monitor labs and rhythm and assess patient for signs and symptoms of electrolyte imbalances  -  Administer electrolyte replacement as ordered  - Monitor response to electrolyte replacements, including rhythm and repeat lab results as appropriate  - Fluid restriction as ordered  - Instruct patient on fluid and nutrition restrictions as appropriate  Outcome: Progressing     Patient admitted to unit from ED. Patient was educated on unit, hand hygiene, and use of the call light. Patient was assessed and vitals were obtained. Vital signs stable at this time. No acute changes noted at this moment. Fall precautions in place- bed locked in lowest position. Call light within reach. Frequent rounding by nursing staff.

## 2024-07-27 NOTE — ED QUICK NOTES
Orders for admission, patient is aware of plan and ready to go upstairs. Any questions, please call ED SKYLER solo at extension [phone].     Patient Covid vaccination status: Unvaccinated     COVID Test Ordered in ED: None    COVID Suspicion at Admission: N/A    Running Infusions:      Mental Status/LOC at time of transport: ao x 4    Other pertinent information:   CIWA score: N/A   NIH score:  N/A

## 2024-07-27 NOTE — ED INITIAL ASSESSMENT (HPI)
PT c/o left sided flank pain.  Dx with pyelo yesterday and discharged, however has not been tolerating oral medications including abx.

## 2024-07-27 NOTE — PROGRESS NOTES
ED Culture Callback Results Review    Pharmacist reviewed culture results from ED visit .    Final urine culture positive for E. coli. Patient was prescribed Cephalexin (Keflex) on discharge. Current therapy is appropriate based on reported susceptibilities. No further intervention required at this time.      Giorgi Horn, Glenn  Emergency Medicine Pharmacist Specialist  07/27/24; 11:51 AM

## 2024-07-27 NOTE — ED PROVIDER NOTES
Patient Seen in: [facility] Emergency Department    History     Chief Complaint   Patient presents with    Fever     Stated Complaint: smith from 7/25, Dx ELIANA    HPI    Patient complains of persistent left flank pain vomiting inability to keep antibiotics down.  Patient was seen in the ER yesterday diagnosed with pyelonephritis sent home on antibiotics states she is been home vomiting all day continues to have left flank pain.  Continues to have fever.  Febrile on arrival here..    Alleviating factors: none  Exacerbating factors: none    Past Medical History:    Recurrent UTI       History reviewed. No pertinent surgical history.         History reviewed. No pertinent family history.    Social History     Socioeconomic History    Marital status: Single   Tobacco Use    Smoking status: Never    Smokeless tobacco: Never   Vaping Use    Vaping status: Never Used   Substance and Sexual Activity    Alcohol use: Never    Drug use: Yes     Types: Cannabis       Review of Systems    Positive for stated complaint: smith from 7/25, Dx ELIANA  Other systems are as noted in HPI.  Constitutional and vital signs reviewed.      All other systems reviewed and negative except as noted above.    PSFH elements reviewed from today and agreed except as otherwise stated in HPI.    Physical Exam     ED Triage Vitals [07/26/24 2128]   /69   Pulse 112   Resp 22   Temp (!) 100.5 °F (38.1 °C)   Temp src Temporal   SpO2 97 %   O2 Device None (Room air)       Current:/69   Pulse 86   Temp (!) 100.5 °F (38.1 °C) (Temporal)   Resp 14   Ht 162.6 cm (5' 4\")   Wt 68 kg   LMP 07/26/2024 (Exact Date)   SpO2 99%   BMI 25.75 kg/m²    PULSE OX nl  GENERAL: appears uncomfortable  HEAD: normocephalic, atraumatic,   EYES: PERRLA, EOMI, conj sclera clear  THROAT: mmm, no lesions  NECK: supple, no meningeal signs  LUNGS: no resp distress, cta bilateral  CARDIO: RRR without murmur  GI: abdomen is soft and non tender, no  masses, nl bowel sounds, l flank tenderness  EXTREMITIES: from, 5/5 strength in all 4 ext, no edema  NEURO: alert and oiented *3, 2-12 intact, no focal deficit noted  SKIN: good skin turgor, no  rashes  PSYCH: calm, cooperative,    Differential includes:uti/ascending pyelo failure of oral abx due to persistent vomiting    ED Course     Labs Reviewed   BASIC METABOLIC PANEL (8) - Abnormal; Notable for the following components:       Result Value    Potassium 3.3 (*)     BUN 7 (*)     BUN/CREA Ratio 7.7 (*)     All other components within normal limits   CBC W/ DIFFERENTIAL - Abnormal; Notable for the following components:    WBC 14.8 (*)     Neutrophil Absolute Prelim 12.29 (*)     Neutrophil Absolute 12.29 (*)     Monocyte Absolute 1.17 (*)     All other components within normal limits   LACTIC ACID, PLASMA - Normal   D-DIMER - Normal   CBC WITH DIFFERENTIAL WITH PLATELET    Narrative:     The following orders were created for panel order CBC With Differential With Platelet.  Procedure                               Abnormality         Status                     ---------                               -----------         ------                     CBC W/ DIFFERENTIAL[[id number]]          Abnormal            Final result                 Please view results for these tests on the individual orders.   BLOOD CULTURE   BLOOD CULTURE       MDM       Cardiac Monitor:   Pulse Readings from Last 1 Encounters:   07/26/24 86   , ,   interpreted by me.    Radiology findings:   No results found.        Medical Decision Making  Problems Addressed:  Pyelonephritis: acute illness or injury     Details: Iv rocephin in ER admit for cont ivf/iv abx    Amount and/or Complexity of Data Reviewed  External Data Reviewed: labs, radiology and notes.  Labs: ordered. Decision-making details documented in ED Course.    Risk  Decision regarding hospitalization.        Disposition and Plan     Clinical Impression:  1. Pyelonephritis         Disposition:  Admit    Follow-up:  No follow-up provider specified.    Medications Prescribed:  Current Discharge Medication List          Hospital Problems       Present on Admission  Date Reviewed: 12/12/2023            ICD-10-CM Noted POA    * (Principal) Pyelonephritis N12 10/19/2019 Unknown

## 2024-07-27 NOTE — PLAN OF CARE
Problem: Patient Centered Care  Goal: Patient preferences are identified and integrated in the patient's plan of care  Description: Interventions:  - What would you like us to know as we care for you? From home with family  - Provide timely, complete, and accurate information to patient/family  - Incorporate patient and family knowledge, values, beliefs, and cultural backgrounds into the planning and delivery of care  - Encourage patient/family to participate in care and decision-making at the level they choose  - Honor patient and family perspectives and choices  Outcome: Progressing     Problem: RESPIRATORY - ADULT  Goal: Achieves optimal ventilation and oxygenation  Description: INTERVENTIONS:  - Assess for changes in respiratory status  - Assess for changes in mentation and behavior  - Position to facilitate oxygenation and minimize respiratory effort  - Oxygen supplementation based on oxygen saturation or ABGs  - Provide Smoking Cessation handout, if applicable  - Encourage broncho-pulmonary hygiene including cough, deep breathe, Incentive Spirometry  - Assess the need for suctioning and perform as needed  - Assess and instruct to report SOB or any respiratory difficulty  - Respiratory Therapy support as indicated  - Manage/alleviate anxiety  - Monitor for signs/symptoms of CO2 retention  Outcome: Progressing     Problem: GASTROINTESTINAL - ADULT  Goal: Minimal or absence of nausea and vomiting  Description: INTERVENTIONS:  - Maintain adequate hydration with IV or PO as ordered and tolerated  - Nasogastric tube to low intermittent suction as ordered  - Evaluate effectiveness of ordered antiemetic medications  - Provide nonpharmacologic comfort measures as appropriate  - Advance diet as tolerated, if ordered  - Obtain nutritional consult as needed  - Evaluate fluid balance  Outcome: Progressing  Goal: Maintains or returns to baseline bowel function  Description: INTERVENTIONS:  - Assess bowel function  -  Maintain adequate hydration with IV or PO as ordered and tolerated  - Evaluate effectiveness of GI medications  - Encourage mobilization and activity  - Obtain nutritional consult as needed  - Establish a toileting routine/schedule  - Consider collaborating with pharmacy to review patient's medication profile  Outcome: Progressing  Goal: Maintains adequate nutritional intake (undernourished)  Description: INTERVENTIONS:  - Monitor percentage of each meal consumed  - Identify factors contributing to decreased intake, treat as appropriate  - Assist with meals as needed  - Monitor I&O, WT and lab values  - Obtain nutritional consult as needed  - Optimize oral hygiene and moisture  - Encourage food from home; allow for food preferences  - Enhance eating environment  Outcome: Progressing     Problem: METABOLIC/FLUID AND ELECTROLYTES - ADULT  Goal: Electrolytes maintained within normal limits  Description: INTERVENTIONS:  - Monitor labs and rhythm and assess patient for signs and symptoms of electrolyte imbalances  - Administer electrolyte replacement as ordered  - Monitor response to electrolyte replacements, including rhythm and repeat lab results as appropriate  - Fluid restriction as ordered  - Instruct patient on fluid and nutrition restrictions as appropriate  Outcome: Progressing       Patient A/Ox4, resting in bed, encourage activity. Pain manage per protocol. Monitor VS, spiked a fever temp 100.4. Continue IV antibx/IV fluids. Call light within reach.

## 2024-07-28 VITALS
HEIGHT: 64 IN | HEART RATE: 72 BPM | BODY MASS INDEX: 26.95 KG/M2 | WEIGHT: 157.88 LBS | TEMPERATURE: 99 F | SYSTOLIC BLOOD PRESSURE: 121 MMHG | RESPIRATION RATE: 18 BRPM | DIASTOLIC BLOOD PRESSURE: 84 MMHG | OXYGEN SATURATION: 100 %

## 2024-07-28 PROCEDURE — 99239 HOSP IP/OBS DSCHRG MGMT >30: CPT | Performed by: INTERNAL MEDICINE

## 2024-07-28 RX ORDER — POLYETHYLENE GLYCOL 3350 17 G/17G
17 POWDER, FOR SOLUTION ORAL
Status: SHIPPED | COMMUNITY
Start: 2024-07-28

## 2024-07-28 RX ORDER — TRAMADOL HYDROCHLORIDE 50 MG/1
50 TABLET ORAL EVERY 6 HOURS PRN
Qty: 5 TABLET | Refills: 0 | Status: SHIPPED | OUTPATIENT
Start: 2024-07-28

## 2024-07-28 RX ORDER — POLYETHYLENE GLYCOL 3350 17 G/17G
17 POWDER, FOR SOLUTION ORAL DAILY
Status: DISCONTINUED | OUTPATIENT
Start: 2024-07-28 | End: 2024-07-28

## 2024-07-28 NOTE — PLAN OF CARE
C/o chills. Afebrile. Pain management overnight. IV fluids infusing. Antibiotics administered. Significant other at bedside.     Problem: Patient Centered Care  Goal: Patient preferences are identified and integrated in the patient's plan of care  Description: Interventions:  - What would you like us to know as we care for you? From home with family  - Provide timely, complete, and accurate information to patient/family  - Incorporate patient and family knowledge, values, beliefs, and cultural backgrounds into the planning and delivery of care  - Encourage patient/family to participate in care and decision-making at the level they choose  - Honor patient and family perspectives and choices  Outcome: Progressing     Problem: Patient/Family Goals  Goal: Patient/Family Long Term Goal  Description: Patient's Long Term Goal: To go home    Interventions:  - Monitor vital signs and labs  - Pain management as needed  - Diagnostics per order  - Follow MD orders  - Administer medications as ordered  - Assists with activities of daily living   - Update patient and family on plan of care  - Discharge planning  - See additional Care Plan goals for specific interventions  Outcome: Progressing  Goal: Patient/Family Short Term Goal  Description: Patient's Short Term Goal: To get better    Interventions:   - Monitor vital signs and labs  - Pain management as needed  - Diagnostics per order  - Follow MD orders  - Administer medications as ordered  - Assists with activities of daily living   - Update patient and family on plan of care  - Discharge planning  - See additional Care Plan goals for specific interventions  Outcome: Progressing     Problem: RESPIRATORY - ADULT  Goal: Achieves optimal ventilation and oxygenation  Description: INTERVENTIONS:  - Assess for changes in respiratory status  - Assess for changes in mentation and behavior  - Position to facilitate oxygenation and minimize respiratory effort  - Oxygen supplementation  based on oxygen saturation or ABGs  - Provide Smoking Cessation handout, if applicable  - Encourage broncho-pulmonary hygiene including cough, deep breathe, Incentive Spirometry  - Assess the need for suctioning and perform as needed  - Assess and instruct to report SOB or any respiratory difficulty  - Respiratory Therapy support as indicated  - Manage/alleviate anxiety  - Monitor for signs/symptoms of CO2 retention  Outcome: Progressing     Problem: GASTROINTESTINAL - ADULT  Goal: Minimal or absence of nausea and vomiting  Description: INTERVENTIONS:  - Maintain adequate hydration with IV or PO as ordered and tolerated  - Nasogastric tube to low intermittent suction as ordered  - Evaluate effectiveness of ordered antiemetic medications  - Provide nonpharmacologic comfort measures as appropriate  - Advance diet as tolerated, if ordered  - Obtain nutritional consult as needed  - Evaluate fluid balance  Outcome: Progressing  Goal: Maintains or returns to baseline bowel function  Description: INTERVENTIONS:  - Assess bowel function  - Maintain adequate hydration with IV or PO as ordered and tolerated  - Evaluate effectiveness of GI medications  - Encourage mobilization and activity  - Obtain nutritional consult as needed  - Establish a toileting routine/schedule  - Consider collaborating with pharmacy to review patient's medication profile  Outcome: Progressing  Goal: Maintains adequate nutritional intake (undernourished)  Description: INTERVENTIONS:  - Monitor percentage of each meal consumed  - Identify factors contributing to decreased intake, treat as appropriate  - Assist with meals as needed  - Monitor I&O, WT and lab values  - Obtain nutritional consult as needed  - Optimize oral hygiene and moisture  - Encourage food from home; allow for food preferences  - Enhance eating environment  Outcome: Progressing     Problem: METABOLIC/FLUID AND ELECTROLYTES - ADULT  Goal: Electrolytes maintained within normal  limits  Description: INTERVENTIONS:  - Monitor labs and rhythm and assess patient for signs and symptoms of electrolyte imbalances  - Administer electrolyte replacement as ordered  - Monitor response to electrolyte replacements, including rhythm and repeat lab results as appropriate  - Fluid restriction as ordered  - Instruct patient on fluid and nutrition restrictions as appropriate  Outcome: Progressing

## 2024-07-28 NOTE — PLAN OF CARE
Problem: Patient Centered Care  Goal: Patient preferences are identified and integrated in the patient's plan of care  Description: Interventions:  - What would you like us to know as we care for you? From home with family  - Provide timely, complete, and accurate information to patient/family  - Incorporate patient and family knowledge, values, beliefs, and cultural backgrounds into the planning and delivery of care  - Encourage patient/family to participate in care and decision-making at the level they choose  - Honor patient and family perspectives and choices  Outcome: Progressing     Problem: RESPIRATORY - ADULT  Goal: Achieves optimal ventilation and oxygenation  Description: INTERVENTIONS:  - Assess for changes in respiratory status  - Assess for changes in mentation and behavior  - Position to facilitate oxygenation and minimize respiratory effort  - Oxygen supplementation based on oxygen saturation or ABGs  - Provide Smoking Cessation handout, if applicable  - Encourage broncho-pulmonary hygiene including cough, deep breathe, Incentive Spirometry  - Assess the need for suctioning and perform as needed  - Assess and instruct to report SOB or any respiratory difficulty  - Respiratory Therapy support as indicated  - Manage/alleviate anxiety  - Monitor for signs/symptoms of CO2 retention  Outcome: Progressing     Problem: GASTROINTESTINAL - ADULT  Goal: Minimal or absence of nausea and vomiting  Description: INTERVENTIONS:  - Maintain adequate hydration with IV or PO as ordered and tolerated  - Nasogastric tube to low intermittent suction as ordered  - Evaluate effectiveness of ordered antiemetic medications  - Provide nonpharmacologic comfort measures as appropriate  - Advance diet as tolerated, if ordered  - Obtain nutritional consult as needed  - Evaluate fluid balance  Outcome: Progressing  Goal: Maintains or returns to baseline bowel function  Description: INTERVENTIONS:  - Assess bowel function  -  Maintain adequate hydration with IV or PO as ordered and tolerated  - Evaluate effectiveness of GI medications  - Encourage mobilization and activity  - Obtain nutritional consult as needed  - Establish a toileting routine/schedule  - Consider collaborating with pharmacy to review patient's medication profile  Outcome: Progressing  Goal: Maintains adequate nutritional intake (undernourished)  Description: INTERVENTIONS:  - Monitor percentage of each meal consumed  - Identify factors contributing to decreased intake, treat as appropriate  - Assist with meals as needed  - Monitor I&O, WT and lab values  - Obtain nutritional consult as needed  - Optimize oral hygiene and moisture  - Encourage food from home; allow for food preferences  - Enhance eating environment  Outcome: Progressing     Problem: METABOLIC/FLUID AND ELECTROLYTES - ADULT  Goal: Electrolytes maintained within normal limits  Description: INTERVENTIONS:  - Monitor labs and rhythm and assess patient for signs and symptoms of electrolyte imbalances  - Administer electrolyte replacement as ordered  - Monitor response to electrolyte replacements, including rhythm and repeat lab results as appropriate  - Fluid restriction as ordered  - Instruct patient on fluid and nutrition restrictions as appropriate  Outcome: Progressing       Patient A/Ox4, self care, ambulates in room. Medically clear for discharge. Call light within reach.

## 2024-07-28 NOTE — DISCHARGE SUMMARY
[facility]  part of [facility]    Discharge Summary    Alysa Espino Patient Status:  Inpatient    2005 MRN [id number]   Location [facility] 5SW/SE Attending Diogo Vega MD   Hosp Day # 1 PCP PHYSICIAN NONSTAFF     Date of Admission: 2024     Date of Discharge: 24      Lace+ Score: 19  59-90 High Risk  29-58 Medium Risk  0-28   Low Risk.    TCM Follow-Up Recommendation:  LACE < 29: Low Risk of readmission after discharge from the hospital. No TCM follow-up needed.    DISCHARGE DX: Principal Problem:    Pyelonephritis       The patient was seen and examined on day of discharge and this discharge summary is in conjunction with any daily progress note from day of discharge.    HPI per admitting physician: \"Alysa Espino is a(n) 19 year old female, with no significant past medical history was seen earlier in the ER complaining of left flank pain nausea vomiting and fevers, was diagnosed with pyelonephritis started on oral antibiotics and discharged however returns today with persisting pain, intractable nausea vomiting unable to tolerate oral meds.  Denies any dysuria, hematuria suprapubic discomfort hesitancy or urgency.\"    Hospital Course:      Acute left pyonephritis complicated by sepsis  -Pt p/w N/V, L flank pain, low grade fevers, leukocytosis.   -Patient started on Rocephin 1 g IVPB every 24 hours  -urine cultures indicate pan sensitive E. Coli  -Pt responded well to treatment  -No further fevers, wbc downtrended, pain improving  -Plan to complete 10 day course of abx, complete course with PO abx as outpt    Intractable nausea vomiting  Pyelonephritis versus cannabis related  -symptom control as needed  -Improving  -Tolerating diet.     Hypokalemia  Likely secondary to repeated emesis  - electrolyte replacement protocol.      Physical Exam:    Vitals:    24 1650 24 2042 24 0418 24 0937   BP:  120/79 124/78 121/84   BP  Location:  Right arm Right arm Right arm   Pulse:  88 85 72   Resp:  18 20 18   Temp: 99.5 °F (37.5 °C) 99.1 °F (37.3 °C) 99.4 °F (37.4 °C) 98.5 °F (36.9 °C)   TempSrc: Oral Oral Oral Oral   SpO2:  99% 98% 100%   Weight:       Height:         Patient Weight for the past 72 hrs:   Weight   07/26/24 2128 150 lb (68 kg)   07/27/24 0041 157 lb 14.4 oz (71.6 kg)       Intake/Output Summary (Last 24 hours) at 7/28/2024 0953  Last data filed at 7/28/2024 0600  Gross per 24 hour   Intake 640 ml   Output --   Net 640 ml         GENERAL:  Awake and alert, in no acute distress.  HEART:  S1 and S2 heard.  RRR   LUNGS:  Air entry was good.  No crackles or wheezes   ABDOMEN: Soft and minimally-tender. Mild L flank pain    PSYCHIATRIC: Normal mood    CULTURE:   Hospital Encounter on 07/26/24   1. Blood Culture     Status: None (Preliminary result)    Collection Time: 07/26/24 10:31 PM    Specimen: Blood,peripheral   Result Value Ref Range    Blood Culture Result No Growth 1 Day N/A       IMAGING STUDIES: SOME MAY NEED FOLLOW UP WITH PCP   CT ABDOMEN+PELVIS(CPT=74176)    Result Date: 7/25/2024  CONCLUSION:   No obstructing renal stone or hydroureteronephrosis.  Large amount of stool throughout the colon, suggesting constipation.  Small amount free fluid within the pelvis, which can be a physiologic finding in a patient of this age.    Dictated by (CST): Kathie Larsen MD on 7/25/2024 at 8:20 AM     Finalized by (CST): Kathie Larsen MD on 7/25/2024 at 8:24 AM           LABS :     Lab Results   Component Value Date    WBC 12.7 (H) 07/27/2024    HGB 10.6 (L) 07/27/2024    HCT 32.3 (L) 07/27/2024    .0 07/27/2024    CREATSERUM 0.67 07/27/2024    BUN 6 (L) 07/27/2024     07/27/2024    K 3.7 07/27/2024     07/27/2024    CO2 22.0 07/27/2024    GLU 92 07/27/2024    CA 8.1 (L) 07/27/2024    ALB 3.9 07/02/2020    ALKPHO 85 07/02/2020    BILT 0.3 07/02/2020    TP 8.0 07/02/2020    AST 17 07/02/2020    ALT 15 07/02/2020     DDIMER 0.44 07/26/2024    CRP 0.49 (H) 02/19/2020       Recent Labs   Lab 07/25/24  0656 07/26/24 2154 07/27/24  0807   RBC 4.63 4.56 3.59*   HGB 13.7 13.5 10.6*   HCT 42.1 41.2 32.3*   MCV 90.9 90.4 90.0   MCH 29.6 29.6 29.5   MCHC 32.5 32.8 32.8   RDW 13.2 13.1 13.0   NEPRELIM 10.75* 12.29* 10.19*   WBC 12.1* 14.8* 12.7*   .0 293.0 236.0     Recent Labs   Lab 07/25/24 0656 07/26/24 2154 07/27/24  0807   GLU 88 99 92   BUN 9 7* 6*   CREATSERUM 0.95 0.91 0.67   CA 9.8 9.8 8.1*    138 140   K 3.6 3.3* 3.7    105 112   CO2 25.0 26.0 22.0     No results found for: \"PT\", \"INR\"    Disposition: Discharge to Home    Condition at Discharge: Stable     Discharge Medications:      Discharge Medications        START taking these medications        Instructions Prescription details   Polyethylene Glycol 3350 17 g Pack  Commonly known as: MIRALAX      Take 17 g by mouth.   Refills: 0     traMADol 50 MG Tabs  Commonly known as: Ultram      Take 1 tablet (50 mg total) by mouth every 6 (six) hours as needed for Pain.   Quantity: 5 tablet  Refills: 0            CONTINUE taking these medications        Instructions Prescription details   cephalexin 500 MG Caps  Commonly known as: Keflex      Take 1 capsule (500 mg total) by mouth 4 (four) times daily for 7 days.   Stop taking on: August 1, 2024  Quantity: 28 capsule  Refills: 0     ondansetron 8 MG Tbdp  Commonly known as: Zofran-ODT      Take 1 tablet (8 mg total) by mouth every 4 (four) hours as needed for Nausea.   Quantity: 6 tablet  Refills: 0               Where to Get Your Medications        These medications were sent to [id number] [place], [place] - [address] AT [facility], [phone], [phone]  [address] [place] [address] [address], [facility] [address]      Phone: [phone]   traMADol 50 MG Tabs         Follow up Visits  No follow-up provider specified.  PHYSICIAN NONSTAFF          Other Discharge Instructions:        ----------------------------------------------------  37 MIN SPENT ON THIS DC   Diogo Vega MD    7/28/2024

## 2024-10-13 ENCOUNTER — HOSPITAL ENCOUNTER (EMERGENCY)
Facility: HOSPITAL | Age: 19
Discharge: HOME | End: 2024-10-13
Attending: EMERGENCY MEDICINE
Payer: MEDICAID

## 2024-10-13 VITALS
SYSTOLIC BLOOD PRESSURE: 111 MMHG | WEIGHT: 155 LBS | TEMPERATURE: 98 F | HEIGHT: 64 IN | HEART RATE: 89 BPM | DIASTOLIC BLOOD PRESSURE: 73 MMHG | RESPIRATION RATE: 18 BRPM | OXYGEN SATURATION: 99 % | BODY MASS INDEX: 26.46 KG/M2

## 2024-10-13 DIAGNOSIS — R06.7 SNEEZING: ICD-10-CM

## 2024-10-13 DIAGNOSIS — J02.9 SORE THROAT: ICD-10-CM

## 2024-10-13 DIAGNOSIS — R09.81 NASAL CONGESTION: Primary | ICD-10-CM

## 2024-10-13 LAB
B-HCG UR QL: NEGATIVE
FLUAV + FLUBV RNA SPEC NAA+PROBE: NEGATIVE
FLUAV + FLUBV RNA SPEC NAA+PROBE: NEGATIVE
RSV RNA SPEC NAA+PROBE: NEGATIVE
S PYO AG THROAT QL: NEGATIVE
SARS-COV-2 RNA RESP QL NAA+PROBE: NOT DETECTED

## 2024-10-13 PROCEDURE — 87880 STREP A ASSAY W/OPTIC: CPT

## 2024-10-13 PROCEDURE — 99283 EMERGENCY DEPT VISIT LOW MDM: CPT

## 2024-10-13 PROCEDURE — 0241U SARS-COV-2/FLU A AND B/RSV BY PCR (GENEXPERT): CPT | Performed by: EMERGENCY MEDICINE

## 2024-10-13 PROCEDURE — 81025 URINE PREGNANCY TEST: CPT

## 2024-10-13 PROCEDURE — 99284 EMERGENCY DEPT VISIT MOD MDM: CPT

## 2024-10-13 RX ORDER — DIPHENHYDRAMINE HCL 25 MG
25 CAPSULE ORAL EVERY 6 HOURS PRN
Qty: 30 CAPSULE | Refills: 0 | Status: SHIPPED | OUTPATIENT
Start: 2024-10-13

## 2024-10-13 RX ORDER — FLUTICASONE PROPIONATE 50 MCG
2 SPRAY, SUSPENSION (ML) NASAL DAILY
Qty: 9.9 ML | Refills: 0 | Status: SHIPPED | OUTPATIENT
Start: 2024-10-13 | End: 2024-10-20

## 2024-10-13 RX ORDER — IBUPROFEN 600 MG/1
600 TABLET, FILM COATED ORAL EVERY 6 HOURS PRN
Qty: 28 TABLET | Refills: 0 | Status: SHIPPED | OUTPATIENT
Start: 2024-10-13 | End: 2024-10-20

## 2024-10-13 RX ORDER — CETIRIZINE HYDROCHLORIDE 10 MG/1
10 TABLET ORAL DAILY PRN
Qty: 30 TABLET | Refills: 0 | Status: SHIPPED | OUTPATIENT
Start: 2024-10-13 | End: 2024-11-12

## 2024-10-13 RX ORDER — OXYMETAZOLINE HYDROCHLORIDE 0.05 G/100ML
1 SPRAY NASAL EVERY 4 HOURS PRN
Qty: 15 ML | Refills: 0 | Status: SHIPPED | OUTPATIENT
Start: 2024-10-13 | End: 2024-10-15

## 2024-10-13 NOTE — DISCHARGE INSTRUCTIONS
Thank you for seeking care at [facility] Emergency Department.  You have been seen and evaluated. Your strep test was negative.      We discussed the results of your workup   Please read the instructions provided   If given prescriptions, take as instructed    Remember, your care process does not end after your visit today. Please follow-up with your doctor within 1-2 days for a follow-up check to ensure you are  improving, to see if you need any further evaluation/testing, or to evaluate for any alternate diagnoses.     Please return to the emergency department if you develop fever, severe headache, chest pain, difficulty breathing, inability to drink liquids without vomiting, one sided numbness or weakness, slurred speech, severe headache, or if you develop any other new or concerning symptoms as these could be signs of more serious medical illness.    We hope you feel better.

## 2024-10-20 ENCOUNTER — HOSPITAL ENCOUNTER (EMERGENCY)
Facility: HOSPITAL | Age: 19
Discharge: HOME | End: 2024-10-20
Attending: EMERGENCY MEDICINE
Payer: MEDICAID

## 2024-10-20 VITALS
BODY MASS INDEX: 26.46 KG/M2 | SYSTOLIC BLOOD PRESSURE: 117 MMHG | HEART RATE: 77 BPM | DIASTOLIC BLOOD PRESSURE: 73 MMHG | WEIGHT: 155 LBS | RESPIRATION RATE: 18 BRPM | HEIGHT: 64 IN | OXYGEN SATURATION: 99 % | TEMPERATURE: 98 F

## 2024-10-20 DIAGNOSIS — N12 PYELONEPHRITIS: Primary | ICD-10-CM

## 2024-10-20 LAB
B-HCG UR QL: NEGATIVE
BILIRUB UR QL: NEGATIVE
COLOR UR: YELLOW
GLUCOSE UR-MCNC: NORMAL MG/DL
KETONES UR-MCNC: NEGATIVE MG/DL
LEUKOCYTE ESTERASE UR QL STRIP.AUTO: 500
PH UR: 6 [PH] (ref 5–8)
PROT UR-MCNC: 100 MG/DL
RBC #/AREA URNS AUTO: >10 /HPF
SP GR UR STRIP: 1.02 (ref 1–1.03)
UROBILINOGEN UR STRIP-ACNC: NORMAL
WBC #/AREA URNS AUTO: >50 /HPF
WBC CLUMPS UR QL AUTO: PRESENT /HPF

## 2024-10-20 PROCEDURE — 87086 URINE CULTURE/COLONY COUNT: CPT | Performed by: EMERGENCY MEDICINE

## 2024-10-20 PROCEDURE — 81025 URINE PREGNANCY TEST: CPT

## 2024-10-20 PROCEDURE — 87186 SC STD MICRODIL/AGAR DIL: CPT | Performed by: EMERGENCY MEDICINE

## 2024-10-20 PROCEDURE — 99283 EMERGENCY DEPT VISIT LOW MDM: CPT

## 2024-10-20 PROCEDURE — 87088 URINE BACTERIA CULTURE: CPT | Performed by: EMERGENCY MEDICINE

## 2024-10-20 PROCEDURE — 81001 URINALYSIS AUTO W/SCOPE: CPT | Performed by: EMERGENCY MEDICINE

## 2024-10-20 PROCEDURE — 99284 EMERGENCY DEPT VISIT MOD MDM: CPT

## 2024-10-20 RX ORDER — CEPHALEXIN 500 MG/1
500 CAPSULE ORAL ONCE
Status: COMPLETED | OUTPATIENT
Start: 2024-10-20 | End: 2024-10-20

## 2024-10-20 RX ORDER — CEPHALEXIN 500 MG/1
500 CAPSULE ORAL 3 TIMES DAILY
Qty: 21 CAPSULE | Refills: 0 | Status: SHIPPED | OUTPATIENT
Start: 2024-10-20 | End: 2024-10-27

## 2024-10-20 RX ORDER — IBUPROFEN 600 MG/1
600 TABLET, FILM COATED ORAL EVERY 8 HOURS PRN
Qty: 30 TABLET | Refills: 0 | Status: SHIPPED | OUTPATIENT
Start: 2024-10-20 | End: 2024-10-27

## 2024-10-20 NOTE — ED INITIAL ASSESSMENT (HPI)
PT c/o right flank pain she states feels like previous pyelonephritis, hx of frequent utis. +vomiting.

## 2024-10-20 NOTE — ED PROVIDER NOTES
Patient Seen in: [facility] Emergency Department    History     Chief Complaint   Patient presents with    Flank Pain       HPI    19-year-old female presents to the emergency department for possible kidney infection.  Patient states that 3 days ago, she started noticing some right back pain along with urinary frequency.  She has noticed a strong urine odor and dysuria sensation.  Patient has had some chills at home but no fevers.  No nausea or vomiting.    History from Independent Source:       External Records Reviewed: On chart review, patient was admitted to the hospital on 26 July for pyelonephritis with nausea and vomiting.  She was treated at that time with IV Rocephin and discharged on cephalexin.    History reviewed.   Past Medical History:    Pyelonephritis    Recurrent UTI    Scoliosis       History reviewed. History reviewed. No pertinent surgical history.      Medications :  Prescriptions Prior to Admission[1]     History reviewed. No pertinent family history.    Smoking Status:   Social History     Socioeconomic History    Marital status: Single   Tobacco Use    Smoking status: Never    Smokeless tobacco: Never   Vaping Use    Vaping status: Never Used   Substance and Sexual Activity    Alcohol use: Never    Drug use: Yes     Types: Cannabis       Constitutional and vital signs reviewed.      Social History and Family History elements reviewed from today, pertinent positives to the presenting problem noted.    Physical Exam     ED Triage Vitals [10/20/24 0548]   /77   Pulse 85   Resp 18   Temp 98.2 °F (36.8 °C)   Temp src Temporal   SpO2 98 %   O2 Device None (Room air)       Physical Exam   Constitutional: AAOx3, well nourished, NAD  HEENT: Normocephalic, PERRLA, MMM  CV: s1s2+, RRR, no m/r/g, normal distal pulses  Pulmonary/Chest: CTA b/l with no rales, wheezes.  No chest wall tenderness  Abdominal: Nontender.  Nondistended. Soft. Bowel sounds are normal.   Neck/Back: Right CVA  tenderness percussion  :   Musculoskeletal: Normal range of motion. No deformity.   Neurological: Awake, alert. Normal reflexes. No cranial nerve deficit.    Skin: Skin is warm and dry. No rash noted. No erythema.   Psychiatric:      All measures to prevent infection transmission during my interaction with the patient were taken. The patient was already wearing a droplet mask on my arrival to the room. Personal protective equipment was worn throughout the duration of the exam.      ED Course        Labs Reviewed   URINALYSIS WITH CULTURE REFLEX - Abnormal; Notable for the following components:       Result Value    Clarity Urine Ex.Turbid (*)     Blood Urine 3+ (*)     Protein Urine 100 (*)     Nitrite Urine 2+ (*)     Leukocyte Esterase Urine 500 (*)     WBC Urine >50 (*)     RBC Urine >10 (*)     Bacteria Urine 1+ (*)     Squamous Epi. Cells Few (*)     WBC Clump Present (*)     All other components within normal limits   POCT PREGNANCY URINE - Normal   URINE CULTURE, ROUTINE     My Independent Interpretation of EKG (if performed):     Monitor Interpretation:   normal sinus rhythm as interpreted by me.      Imaging Results Available and Reviewed while in ED: No results found.  ED Medications Administered:   Medications   cephALEXin (Keflex) cap 500 mg (has no administration in time range)             MDM     Vitals:    10/20/24 0548   BP: 115/77   Pulse: 85   Resp: 18   Temp: 98.2 °F (36.8 °C)   TempSrc: Temporal   SpO2: 98%   Weight: 70.3 kg   Height: 162.6 cm (5' 4\")     *I personally reviewed and interpreted all ED vitals.    Independent Interpretation of Studies:     Social Determinants of Health:     Procedures:      Differential/MDM/Shared Decision Making: Differential Diagnosis includes pyelonephritis, UTI, back strain, cholecystitis, others.      The patient already  has a past medical history of Pyelonephritis, Recurrent UTI, and Scoliosis.  to contribute to the complexity of this ED evaluation.            Medications, Diagnostics, or Disposition considered but not done:     Patient does have a dirty urinalysis.  I believe she is having symptoms from pyelonephritis.  Management of case was discussed with patient and she was given first dose of cephalexin in the ED.  Reviewed prior urine culture from July of this year which was pansensitive.  Discussed findings with patient and she is comfortable with discharge on ibuprofen and cephalexin with primary care follow-up.      Condition upon leaving the department: Stable    Disposition and Plan     Clinical Impression:  1. Pyelonephritis        Disposition:  Discharge    Follow-up:  Nonstaff, Physician          Supa Coulter MD  [address] [place] [id number]  [phone]    Call in 1 day(s)        Medications Prescribed:  Current Discharge Medication List        START taking these medications    Details   !! ibuprofen 600 MG Oral Tab Take 1 tablet (600 mg total) by mouth every 8 (eight) hours as needed for Pain or Fever.  Qty: 30 tablet, Refills: 0       !! - Potential duplicate medications found. Please discuss with provider.                   [1] (Not in a hospital admission)

## 2024-10-27 ENCOUNTER — HOSPITAL ENCOUNTER (EMERGENCY)
Facility: HOSPITAL | Age: 19
Discharge: HOME | End: 2024-10-27
Attending: EMERGENCY MEDICINE
Payer: MEDICAID

## 2024-10-27 VITALS
OXYGEN SATURATION: 100 % | TEMPERATURE: 98 F | SYSTOLIC BLOOD PRESSURE: 113 MMHG | HEART RATE: 52 BPM | DIASTOLIC BLOOD PRESSURE: 73 MMHG | RESPIRATION RATE: 16 BRPM

## 2024-10-27 DIAGNOSIS — N39.0 URINARY TRACT INFECTION WITHOUT HEMATURIA, SITE UNSPECIFIED: Primary | ICD-10-CM

## 2024-10-27 LAB
ALBUMIN SERPL-MCNC: 4.4 G/DL (ref 3.2–4.8)
ALBUMIN/GLOB SERPL: 1.5 {RATIO} (ref 1–2)
ALP LIVER SERPL-CCNC: 79 U/L
ALT SERPL-CCNC: <7 U/L
ANION GAP SERPL CALC-SCNC: 5 MMOL/L (ref 0–18)
AST SERPL-CCNC: 12 U/L (ref ?–34)
B-HCG UR QL: NEGATIVE
BASOPHILS # BLD AUTO: 0.03 X10(3) UL (ref 0–0.2)
BASOPHILS NFR BLD AUTO: 0.4 %
BILIRUB SERPL-MCNC: 0.3 MG/DL (ref 0.3–1.2)
BILIRUB UR QL: NEGATIVE
BUN BLD-MCNC: 10 MG/DL (ref 9–23)
BUN/CREAT SERPL: 11.6 (ref 10–20)
CALCIUM BLD-MCNC: 9.2 MG/DL (ref 8.7–10.4)
CHLORIDE SERPL-SCNC: 110 MMOL/L (ref 98–112)
CO2 SERPL-SCNC: 28 MMOL/L (ref 21–32)
COLOR UR: YELLOW
CREAT BLD-MCNC: 0.86 MG/DL
DEPRECATED RDW RBC AUTO: 42.9 FL (ref 35.1–46.3)
EGFRCR SERPLBLD CKD-EPI 2021: 99 ML/MIN/1.73M2 (ref 60–?)
EOSINOPHIL # BLD AUTO: 0.08 X10(3) UL (ref 0–0.7)
EOSINOPHIL NFR BLD AUTO: 1.1 %
ERYTHROCYTE [DISTWIDTH] IN BLOOD BY AUTOMATED COUNT: 13 % (ref 11–15)
GLOBULIN PLAS-MCNC: 2.9 G/DL (ref 2–3.5)
GLUCOSE BLD-MCNC: 92 MG/DL (ref 70–99)
GLUCOSE UR-MCNC: NEGATIVE MG/DL
HCT VFR BLD AUTO: 37.3 %
HGB BLD-MCNC: 12.1 G/DL
IMM GRANULOCYTES # BLD AUTO: 0.02 X10(3) UL (ref 0–1)
IMM GRANULOCYTES NFR BLD: 0.3 %
LIPASE SERPL-CCNC: 28 U/L (ref 12–53)
LYMPHOCYTES # BLD AUTO: 2.3 X10(3) UL (ref 1–4)
LYMPHOCYTES NFR BLD AUTO: 31.3 %
MCH RBC QN AUTO: 29.2 PG (ref 26–34)
MCHC RBC AUTO-ENTMCNC: 32.4 G/DL (ref 31–37)
MCV RBC AUTO: 90.1 FL
MONOCYTES # BLD AUTO: 0.72 X10(3) UL (ref 0.1–1)
MONOCYTES NFR BLD AUTO: 9.8 %
NEUTROPHILS # BLD AUTO: 4.2 X10 (3) UL (ref 1.5–7.7)
NEUTROPHILS # BLD AUTO: 4.2 X10(3) UL (ref 1.5–7.7)
NEUTROPHILS NFR BLD AUTO: 57.1 %
NITRITE UR QL STRIP.AUTO: NEGATIVE
OSMOLALITY SERPL CALC.SUM OF ELEC: 295 MOSM/KG (ref 275–295)
PH UR: 5.5 [PH] (ref 5–8)
PLATELET # BLD AUTO: 324 10(3)UL (ref 150–450)
POTASSIUM SERPL-SCNC: 3.8 MMOL/L (ref 3.5–5.1)
PROT SERPL-MCNC: 7.3 G/DL (ref 5.7–8.2)
RBC # BLD AUTO: 4.14 X10(6)UL
RBC #/AREA URNS AUTO: >10 /HPF
RBC #/AREA URNS AUTO: >10 /HPF
SODIUM SERPL-SCNC: 143 MMOL/L (ref 136–145)
SP GR UR STRIP: 1.01 (ref 1–1.03)
UROBILINOGEN UR STRIP-ACNC: 0.2
WBC # BLD AUTO: 7.4 X10(3) UL (ref 4–11)
WBC #/AREA URNS AUTO: >50 /HPF
WBC #/AREA URNS AUTO: >50 /HPF
WBC CLUMPS UR QL AUTO: PRESENT /HPF
WBC CLUMPS UR QL AUTO: PRESENT /HPF

## 2024-10-27 PROCEDURE — 36415 COLL VENOUS BLD VENIPUNCTURE: CPT

## 2024-10-27 PROCEDURE — 87186 SC STD MICRODIL/AGAR DIL: CPT | Performed by: EMERGENCY MEDICINE

## 2024-10-27 PROCEDURE — 83690 ASSAY OF LIPASE: CPT | Performed by: EMERGENCY MEDICINE

## 2024-10-27 PROCEDURE — 87086 URINE CULTURE/COLONY COUNT: CPT | Performed by: EMERGENCY MEDICINE

## 2024-10-27 PROCEDURE — 81015 MICROSCOPIC EXAM OF URINE: CPT | Performed by: EMERGENCY MEDICINE

## 2024-10-27 PROCEDURE — 99283 EMERGENCY DEPT VISIT LOW MDM: CPT

## 2024-10-27 PROCEDURE — 80053 COMPREHEN METABOLIC PANEL: CPT | Performed by: EMERGENCY MEDICINE

## 2024-10-27 PROCEDURE — 87077 CULTURE AEROBIC IDENTIFY: CPT | Performed by: EMERGENCY MEDICINE

## 2024-10-27 PROCEDURE — 81001 URINALYSIS AUTO W/SCOPE: CPT | Performed by: EMERGENCY MEDICINE

## 2024-10-27 PROCEDURE — S0119 ONDANSETRON 4 MG: HCPCS | Performed by: EMERGENCY MEDICINE

## 2024-10-27 PROCEDURE — 85025 COMPLETE CBC W/AUTO DIFF WBC: CPT | Performed by: EMERGENCY MEDICINE

## 2024-10-27 PROCEDURE — 99284 EMERGENCY DEPT VISIT MOD MDM: CPT

## 2024-10-27 PROCEDURE — 81025 URINE PREGNANCY TEST: CPT

## 2024-10-27 RX ORDER — FAMOTIDINE 20 MG/1
20 TABLET, FILM COATED ORAL ONCE
Status: COMPLETED | OUTPATIENT
Start: 2024-10-27 | End: 2024-10-27

## 2024-10-27 RX ORDER — DICYCLOMINE HCL 20 MG
20 TABLET ORAL ONCE
Status: COMPLETED | OUTPATIENT
Start: 2024-10-27 | End: 2024-10-27

## 2024-10-27 RX ORDER — SULFAMETHOXAZOLE AND TRIMETHOPRIM 800; 160 MG/1; MG/1
1 TABLET ORAL 2 TIMES DAILY
Qty: 14 TABLET | Refills: 0 | Status: SHIPPED | OUTPATIENT
Start: 2024-10-27 | End: 2024-10-29 | Stop reason: ALTCHOICE

## 2024-10-27 RX ORDER — ONDANSETRON 4 MG/1
4 TABLET, ORALLY DISINTEGRATING ORAL EVERY 4 HOURS PRN
Qty: 10 TABLET | Refills: 0 | Status: SHIPPED | OUTPATIENT
Start: 2024-10-27 | End: 2024-11-03

## 2024-10-27 RX ORDER — ONDANSETRON 4 MG/1
4 TABLET, ORALLY DISINTEGRATING ORAL ONCE
Status: COMPLETED | OUTPATIENT
Start: 2024-10-27 | End: 2024-10-27

## 2024-10-28 NOTE — ED PROVIDER NOTES
[facility]  Emergency Department Attending Note     Chief Complaint:   Chief Complaint   Patient presents with    Vomiting     HISTORY OF PRESENT ILLNESS:   Alysa Espino is a 19 year old female who presents to the ED with past medical history including recurrent urinary tract infection, recent urinary tract infection treated with Keflex presents with a complaint of nausea and vomiting over the last few days.  States after she takes the Keflex she felt nauseous so she stopped taking the Keflex.  States the back pain has resolved.  States her urinary symptoms had resolved but started to return with some dysuria.  Denies hematemesis hematochezia or melena.  Denies fever chills.  Denies chest pain dyspnea.  No blurred or exertional complaints.     MEDICAL & SOCIAL HISTORY:   Past Medical History:    Pyelonephritis    Recurrent UTI    Scoliosis    History reviewed. No pertinent surgical history.   Social History     Socioeconomic History    Marital status: Single   Tobacco Use    Smoking status: Never    Smokeless tobacco: Never   Vaping Use    Vaping status: Never Used   Substance and Sexual Activity    Alcohol use: Never    Drug use: Yes     Types: Cannabis     Social Drivers of Health     Food Insecurity: No Food Insecurity (7/27/2024)    Food Insecurity     Food Insecurity: Never true   Transportation Needs: No Transportation Needs (7/27/2024)    Transportation Needs     Lack of Transportation: No   Housing Stability: Low Risk  (7/27/2024)    Housing Stability     Housing Instability: No    Allergies[1]   Current Outpatient Medications   Medication Sig Dispense Refill    sulfamethoxazole-trimethoprim -160 MG Oral Tab per tablet Take 1 tablet by mouth 2 (two) times daily for 7 days. 14 tablet 0    ondansetron 4 MG Oral Tablet Dispersible Take 1 tablet (4 mg total) by mouth every 4 (four) hours as needed for Nausea. 10 tablet 0    ibuprofen 600 MG Oral Tab Take 1 tablet (600 mg total) by mouth every  8 (eight) hours as needed for Pain or Fever. 30 tablet 0    cephALEXin 500 MG Oral Cap Take 1 capsule (500 mg total) by mouth 3 (three) times daily for 7 days. 21 capsule 0    cetirizine 10 MG Oral Tab Take 1 tablet (10 mg total) by mouth daily as needed for Rhinitis or Allergies. 30 tablet 0    diphenhydrAMINE 25 MG Oral Cap Take 1 capsule (25 mg total) by mouth every 6 (six) hours as needed for Itching or Allergies. 30 capsule 0    polyethylene glycol, PEG 3350, 17 g Oral Powd Pack Take 17 g by mouth.      traMADol 50 MG Oral Tab Take 1 tablet (50 mg total) by mouth every 6 (six) hours as needed for Pain. 5 tablet 0    ondansetron 8 MG Oral Tablet Dispersible Take 1 tablet (8 mg total) by mouth every 4 (four) hours as needed for Nausea. 6 tablet 0          REVIEW OF SYSTEMS   A 10 point review of systems was completed and is negative except as listed in history of present illness      PHYSICAL EXAM   Vitals: /81   Pulse 74   Temp 98 °F (36.7 °C)   Resp 20   LMP 10/20/2024 (Exact Date)   SpO2 100%   /81   Pulse 74   Temp 98 °F (36.7 °C)   Resp 20   LMP 10/20/2024 (Exact Date)   SpO2 100%     General: A&Ox3, NAD  Constitutional: Well developed, well nourished, nontoxic  Head: atraumatic, normocephalic   Eyes: conjuctiva clear, no icterus, PERRL, EOMI, vision grossly normal  Ears: normal external appearance, no drainage  Nose:  Atraumatic, no swelling, no drainage, nares patent  Throat:  Moist pink mucous membranes, airway is patent  Neck:  Soft supple, no masses, no tracheal deviation, no stridor  Chest:  No bruising or abrasions, no tenderness, no deformity  Cardiac:  Regular rate and rhythm, no murmurs rubs or gallops.  Lung:  No distress, no retractions. Clear to auscultation bilaterally, no w/r/r  Abdomen:  Soft, nontender, nondistended, normal BS  Back: No stepoff/deformity no CVA tenderness  Extremities: FROM all ext, no deformities, intact equal peripheral pulses, no cyanosis or  edema  Neuro: No facial droop, no slurred speech, moving all extremities freely, SILT to the bilateral upper and lower extremities  Psych: A&Ox3, normal affect, cooperative, calm  Skin: No rash, no petechiae/purpura, warm, dry      RESULTS  LABS:   Results for orders placed or performed during the hospital encounter of 10/27/24   Urinalysis with Culture Reflex    Collection Time: 10/27/24  7:48 PM    Specimen: Urine, clean catch   Result Value Ref Range    Urine Color Yellow Yellow    Clarity Urine Cloudy (A) Clear    Spec Gravity 1.015 1.005 - 1.030    Glucose Urine Negative Negative mg/dL    Bilirubin Urine Negative Negative    Ketones Urine Trace (A) Negative mg/dL    Blood Urine Moderate (A) Negative    pH Urine 5.5 5.0 - 8.0    Protein Urine 100 mg/dL (A) Negative mg/dL    Urobilinogen Urine 0.2 0.2    Nitrite Urine Negative Negative    Leukocyte Esterase Urine Large (A) Negative    WBC Urine >50 (A) 0 - 5 /HPF    RBC Urine >10 (A) 0 - 2 /HPF    Bacteria Urine 1+ (A) None Seen /HPF    Squamous Epi. Cells None Seen None Seen /HPF    Renal Tubular Epithelial Cells None Seen None Seen /HPF    Transitional Cells Few (A) None Seen /HPF    Yeast Urine None Seen None Seen /HPF    WBC Clump Present (A) None Seen /HPF   UA Microscopic only, urine    Collection Time: 10/27/24  7:48 PM   Result Value Ref Range    WBC Urine >50 (A) 0 - 5 /HPF    RBC Urine >10 (A) 0 - 2 /HPF    Bacteria Urine 1+ (A) None Seen /HPF    Squamous Epi. Cells None Seen None Seen /HPF    Renal Tubular Epithelial Cells None Seen None Seen /HPF    Transitional Cells Few (A) None Seen /HPF    Yeast Urine None Seen None Seen /HPF    WBC Clump Present (A) None Seen /HPF   POCT Pregnancy, Urine    Collection Time: 10/27/24  7:50 PM   Result Value Ref Range    POCT Urine Pregnancy Negative Negative   Lipase    Collection Time: 10/27/24  8:41 PM   Result Value Ref Range    Lipase 28 12 - 53 U/L   Comp Metabolic Panel (14)    Collection Time: 10/27/24   8:41 PM   Result Value Ref Range    Glucose 92 70 - 99 mg/dL    Sodium 143 136 - 145 mmol/L    Potassium 3.8 3.5 - 5.1 mmol/L    Chloride 110 98 - 112 mmol/L    CO2 28.0 21.0 - 32.0 mmol/L    Anion Gap 5 0 - 18 mmol/L    BUN 10 9 - 23 mg/dL    Creatinine 0.86 0.55 - 1.02 mg/dL    BUN/CREA Ratio 11.6 10.0 - 20.0    Calcium, Total 9.2 8.7 - 10.4 mg/dL    Calculated Osmolality 295 275 - 295 mOsm/kg    eGFR-Cr 99 >=60 mL/min/1.73m2    ALT <7 (L) 10 - 49 U/L    AST 12 <34 U/L    Alkaline Phosphatase 79 52 - 144 U/L    Bilirubin, Total 0.3 0.3 - 1.2 mg/dL    Total Protein 7.3 5.7 - 8.2 g/dL    Albumin 4.4 3.2 - 4.8 g/dL    Globulin  2.9 2.0 - 3.5 g/dL    A/G Ratio 1.5 1.0 - 2.0   CBC With Differential With Platelet    Collection Time: 10/27/24  8:41 PM   Result Value Ref Range    WBC 7.4 4.0 - 11.0 x10(3) uL    RBC 4.14 3.80 - 5.30 x10(6)uL    HGB 12.1 12.0 - 16.0 g/dL    HCT 37.3 35.0 - 48.0 %    MCV 90.1 80.0 - 100.0 fL    MCH 29.2 26.0 - 34.0 pg    MCHC 32.4 31.0 - 37.0 g/dL    RDW-SD 42.9 35.1 - 46.3 fL    RDW 13.0 11.0 - 15.0 %    .0 150.0 - 450.0 10(3)uL    Neutrophil Absolute Prelim 4.20 1.50 - 7.70 x10 (3) uL    Neutrophil Absolute 4.20 1.50 - 7.70 x10(3) uL    Lymphocyte Absolute 2.30 1.00 - 4.00 x10(3) uL    Monocyte Absolute 0.72 0.10 - 1.00 x10(3) uL    Eosinophil Absolute 0.08 0.00 - 0.70 x10(3) uL    Basophil Absolute 0.03 0.00 - 0.20 x10(3) uL    Immature Granulocyte Absolute 0.02 0.00 - 1.00 x10(3) uL    Neutrophil % 57.1 %    Lymphocyte % 31.3 %    Monocyte % 9.8 %    Eosinophil % 1.1 %    Basophil % 0.4 %    Immature Granulocyte % 0.3 %         IMAGING: No results found.        Procedures:   Procedures     ED COURSE          Re-Evaluation: Improved      Disposition & Plan:   Clinical Impression/Final Diagnosis:   1. Urinary tract infection without hematuria, site unspecified        Medical Decision Making: This patient presents with abdominal discomfort of uncertain etiology. The evaluation has  not  identified a emergent etiology for the symptoms. Specifically, given the very benign exam, normal labs,  and lack of significant risk factors, I have very low suspicion for appendicitis, ischemic gut, perforation or  other life threatening disease.  I have discussed with the patient the level of uncertainty with undifferentiated abdominal discomfort  and explained the need to follow-up as noted on the discharge instructions, or return immediately if the  pain worsens, fever, vomiting, or for any new symptoms or concerns.    Prior micro reviewed, susceptible to Bactrim we will start Bactrim patient did not finish prior Keflex recommend switch to Bactrim now he    Medical Decision Making  Amount and/or Complexity of Data Reviewed  External Data Reviewed: notes.  Labs: ordered. Decision-making details documented in ED Course.    Risk  OTC drugs.  Prescription drug management.  Decision regarding hospitalization.        Disposition: Discharge  There are no disposition comments on file for this visit.     This note was generated in part using voice recognition dictation technology. The report was reviewed by this physician but still may have unintentional errors due to inherent limitations of voice recognition technology. All times are estimates.         [1]   Allergies  Allergen Reactions    Dog Fennel Allergy Skin Test ITCHING

## 2024-10-28 NOTE — ED INITIAL ASSESSMENT (HPI)
S: Pt presents to ED with c/o vomiting after abx. Dx with uti/pyleo last week, pt states that all week she's been vomiting about 30 min after taking the abx or not taking it if she can't eat with it due to nausea.

## 2024-10-29 RX ORDER — NITROFURANTOIN 25; 75 MG/1; MG/1
100 CAPSULE ORAL 2 TIMES DAILY
Qty: 14 CAPSULE | Refills: 0 | Status: SHIPPED | OUTPATIENT
Start: 2024-10-29 | End: 2024-11-05

## 2025-03-31 ENCOUNTER — HOSPITAL ENCOUNTER (EMERGENCY)
Facility: HOSPITAL | Age: 20
Discharge: HOME | End: 2025-03-31
Attending: EMERGENCY MEDICINE
Payer: MEDICAID

## 2025-03-31 VITALS
OXYGEN SATURATION: 100 % | RESPIRATION RATE: 17 BRPM | TEMPERATURE: 98 F | HEART RATE: 62 BPM | WEIGHT: 153 LBS | BODY MASS INDEX: 26.12 KG/M2 | SYSTOLIC BLOOD PRESSURE: 127 MMHG | HEIGHT: 64 IN | DIASTOLIC BLOOD PRESSURE: 76 MMHG

## 2025-03-31 DIAGNOSIS — R19.7 NAUSEA VOMITING AND DIARRHEA: Primary | ICD-10-CM

## 2025-03-31 DIAGNOSIS — R11.2 NAUSEA VOMITING AND DIARRHEA: Primary | ICD-10-CM

## 2025-03-31 LAB
ANION GAP SERPL CALC-SCNC: 9 MMOL/L (ref 0–18)
B-HCG UR QL: NEGATIVE
BASOPHILS # BLD AUTO: 0.03 X10(3) UL (ref 0–0.2)
BASOPHILS NFR BLD AUTO: 0.3 %
BILIRUB UR QL: NEGATIVE
BUN BLD-MCNC: 7 MG/DL (ref 9–23)
BUN/CREAT SERPL: 8.9 (ref 10–20)
CALCIUM BLD-MCNC: 8.5 MG/DL (ref 8.7–10.4)
CHLORIDE SERPL-SCNC: 109 MMOL/L (ref 98–112)
CLARITY UR: CLEAR
CO2 SERPL-SCNC: 24 MMOL/L (ref 21–32)
CREAT BLD-MCNC: 0.79 MG/DL
DEPRECATED RDW RBC AUTO: 42.7 FL (ref 35.1–46.3)
EGFRCR SERPLBLD CKD-EPI 2021: 109 ML/MIN/1.73M2 (ref 60–?)
EOSINOPHIL # BLD AUTO: 0.03 X10(3) UL (ref 0–0.7)
EOSINOPHIL NFR BLD AUTO: 0.3 %
ERYTHROCYTE [DISTWIDTH] IN BLOOD BY AUTOMATED COUNT: 13.2 % (ref 11–15)
FLUAV + FLUBV RNA SPEC NAA+PROBE: NEGATIVE
FLUAV + FLUBV RNA SPEC NAA+PROBE: NEGATIVE
GLUCOSE BLD-MCNC: 77 MG/DL (ref 70–99)
GLUCOSE UR-MCNC: NORMAL MG/DL
HCT VFR BLD AUTO: 38.4 %
HGB BLD-MCNC: 12.8 G/DL
HGB UR QL STRIP.AUTO: NEGATIVE
IMM GRANULOCYTES # BLD AUTO: 0.03 X10(3) UL (ref 0–1)
IMM GRANULOCYTES NFR BLD: 0.3 %
KETONES UR-MCNC: NEGATIVE MG/DL
LEUKOCYTE ESTERASE UR QL STRIP.AUTO: 75
LIPASE SERPL-CCNC: 23 U/L (ref 12–53)
LYMPHOCYTES # BLD AUTO: 1.47 X10(3) UL (ref 1–4)
LYMPHOCYTES NFR BLD AUTO: 13.6 %
MCH RBC QN AUTO: 29.6 PG (ref 26–34)
MCHC RBC AUTO-ENTMCNC: 33.3 G/DL (ref 31–37)
MCV RBC AUTO: 88.7 FL
MONOCYTES # BLD AUTO: 0.79 X10(3) UL (ref 0.1–1)
MONOCYTES NFR BLD AUTO: 7.3 %
NEUTROPHILS # BLD AUTO: 8.49 X10 (3) UL (ref 1.5–7.7)
NEUTROPHILS # BLD AUTO: 8.49 X10(3) UL (ref 1.5–7.7)
NEUTROPHILS NFR BLD AUTO: 78.2 %
NITRITE UR QL STRIP.AUTO: NEGATIVE
OSMOLALITY SERPL CALC.SUM OF ELEC: 291 MOSM/KG (ref 275–295)
PH UR: 7 [PH] (ref 5–8)
PLATELET # BLD AUTO: 272 10(3)UL (ref 150–450)
POTASSIUM SERPL-SCNC: 2.8 MMOL/L (ref 3.5–5.1)
PROT UR-MCNC: NEGATIVE MG/DL
RBC # BLD AUTO: 4.33 X10(6)UL
RSV RNA SPEC NAA+PROBE: NEGATIVE
SARS-COV-2 RNA RESP QL NAA+PROBE: NOT DETECTED
SODIUM SERPL-SCNC: 142 MMOL/L (ref 136–145)
SP GR UR STRIP: 1.01 (ref 1–1.03)
UROBILINOGEN UR STRIP-ACNC: NORMAL
WBC # BLD AUTO: 10.8 X10(3) UL (ref 4–11)

## 2025-03-31 PROCEDURE — 87086 URINE CULTURE/COLONY COUNT: CPT | Performed by: EMERGENCY MEDICINE

## 2025-03-31 PROCEDURE — 99284 EMERGENCY DEPT VISIT MOD MDM: CPT

## 2025-03-31 PROCEDURE — 83690 ASSAY OF LIPASE: CPT | Performed by: EMERGENCY MEDICINE

## 2025-03-31 PROCEDURE — 80048 BASIC METABOLIC PNL TOTAL CA: CPT | Performed by: EMERGENCY MEDICINE

## 2025-03-31 PROCEDURE — 81001 URINALYSIS AUTO W/SCOPE: CPT | Performed by: EMERGENCY MEDICINE

## 2025-03-31 PROCEDURE — 96361 HYDRATE IV INFUSION ADD-ON: CPT

## 2025-03-31 PROCEDURE — 85025 COMPLETE CBC W/AUTO DIFF WBC: CPT | Performed by: EMERGENCY MEDICINE

## 2025-03-31 PROCEDURE — 96374 THER/PROPH/DIAG INJ IV PUSH: CPT

## 2025-03-31 PROCEDURE — 81025 URINE PREGNANCY TEST: CPT

## 2025-03-31 PROCEDURE — 0241U SARS-COV-2/FLU A AND B/RSV BY PCR (GENEXPERT): CPT | Performed by: EMERGENCY MEDICINE

## 2025-03-31 RX ORDER — ONDANSETRON 2 MG/ML
4 INJECTION INTRAMUSCULAR; INTRAVENOUS ONCE
Status: COMPLETED | OUTPATIENT
Start: 2025-03-31 | End: 2025-03-31

## 2025-03-31 RX ORDER — ONDANSETRON 4 MG/1
4 TABLET, ORALLY DISINTEGRATING ORAL EVERY 4 HOURS PRN
Qty: 10 TABLET | Refills: 0 | Status: SHIPPED | OUTPATIENT
Start: 2025-03-31 | End: 2025-04-03

## 2025-03-31 NOTE — ED PROVIDER NOTES
Patient Seen in: [facility] Emergency Department      History     Chief Complaint   Patient presents with    Nausea/Vomiting/Diarrhea     Stated Complaint: N/V/D    Subjective:   HPI      Patient is a 19-year-old female that states she has been ill with nausea vomiting and diarrhea for about a week.  She states he vomits a couple times a day and has 3 or 4 loose stools.  No fever no chills no abdominal pain no urinary symptoms.  She states when her symptoms started she had URI symptoms but those seem to resolve.    Objective:     Past Medical History:    Pyelonephritis    Recurrent UTI    Scoliosis              History reviewed. No pertinent surgical history.             Social History     Socioeconomic History    Marital status: Single   Tobacco Use    Smoking status: Never    Smokeless tobacco: Never   Vaping Use    Vaping status: Never Used   Substance and Sexual Activity    Alcohol use: Never    Drug use: Yes     Frequency: 3.0 times per week     Types: Cannabis     Social Drivers of Health     Food Insecurity: No Food Insecurity (7/27/2024)    Food Insecurity     Food Insecurity: Never true   Transportation Needs: No Transportation Needs (7/27/2024)    Transportation Needs     Lack of Transportation: No   Housing Stability: Low Risk  (7/27/2024)    Housing Stability     Housing Instability: No                  Physical Exam     ED Triage Vitals [03/31/25 1811]   /83   Pulse 58   Resp 20   Temp 98.1 °F (36.7 °C)   Temp src Oral   SpO2 99 %   O2 Device None (Room air)       Current Vitals:   Vital Signs  BP: 127/76  Pulse: 62  Resp: 17  Temp: 98.1 °F (36.7 °C)  Temp src: Oral  MAP (mmHg): 100    Oxygen Therapy  SpO2: 100 %  O2 Device: None (Room air)        Physical Exam  Constitutional: Oriented to person, place, and time. Appears well-developed and well-nourished.   HEENT:   Head: Normocephalic and atraumatic.   Right Ear: External ear normal.   Left Ear: External ear normal.   Nose: Nose normal.    Mouth/Throat: Oropharynx is clear and moist.   Eyes: Conjunctivae and EOM are normal. Pupils are equal, round, and reactive to light.   Neck: Neck supple.   Cardiovascular: Normal rate, regular rhythm, normal heart sounds and intact distal pulses.    Pulmonary/Chest: Effort normal and breath sounds normal. No respiratory distress.   Abdominal: Soft. Bowel sounds are normal. Exhibits no distension and no mass. There is no tenderness. There is no rebound and no guarding.   Musculoskeletal: Normal range of motion. Exhibits no edema or tenderness.   Lymphadenopathy: No cervical adenopathy.   Neurological: Alert and oriented to person, place, and time. Normal reflexes. No cranial nerve deficit. No motor os sensory defecits noted Coordination normal.   Skin: Skin is warm and dry.   Psychiatric: Normal mood and affect. Behavior is normal. Judgment and thought content normal.   Nursing note and vitals reviewed.        ED Course     Labs Reviewed   CBC WITH DIFFERENTIAL WITH PLATELET - Abnormal; Notable for the following components:       Result Value    Neutrophil Absolute Prelim 8.49 (*)     Neutrophil Absolute 8.49 (*)     All other components within normal limits   URINALYSIS WITH CULTURE REFLEX - Abnormal; Notable for the following components:    Leukocyte Esterase Urine 75 (*)     WBC Urine 11-20 (*)     Squamous Epi. Cells Few (*)     All other components within normal limits   POCT PREGNANCY URINE - Normal   SARS-COV-2/FLU A AND B/RSV BY PCR (GENEXPERT) - Normal    Narrative:     This test is intended for the qualitative detection and differentiation of SARS-CoV-2, influenza A, influenza B, and respiratory syncytial virus (RSV) viral RNA in nasopharyngeal or nares swabs from individuals suspected of respiratory viral infection consistent with COVID-19 by their healthcare provider. Signs and symptoms of respiratory viral infection due to SARS-CoV-2, influenza, and RSV can be similar.    Test performed using the  Xpert Xpress SARS-CoV-2/FLU/RSV (real time RT-PCR)  assay on the GeneXpert instrument, [employer], [place] [address].   This test is being used under the Food and Drug Administration's Emergency Use Authorization.    The authorized Fact Sheet for Healthcare Providers for this assay is available upon request from the laboratory.   LIPASE   BASIC METABOLIC PANEL (8)   URINE CULTURE, ROUTINE                   MDM      Use of independent historian:     I personally reviewed and interpreted the images :     No results found.    Vitals:    03/31/25 1809 03/31/25 1811 03/31/25 2050   BP:  133/83 127/76   BP Location:   Right arm   Pulse:  58 62   Resp:  20 17   Temp:  98.1 °F (36.7 °C)    TempSrc:  Oral    SpO2:  99% 100%   Weight: 69.4 kg     Height: 162.6 cm (5' 4\")       *I personally reviewed and interpreted all ED vitals.    Pulse Ox: 99%, Room air, Normal         Differential Diagnosis/ Diagnostic Considerations: Nausea vomiting diarrhea likely viral gastroenteritis consider food related illness.  Consider dehydration.  Consider urinary tract infection    Medical Record Review: I personally reviewed available prior medical records for any recent pertinent discharge summaries, testing, and procedures and reviewed those reports and found discharge summary from 7/26/2024 for pyelonephritis notes reviewed.    Complicating Factors: The patient already has urinary tract infection pyelonephritis which contribute to the complexity of this ED evaluation.    Social determinants of health:    Prescription drug management:      Shared Decision Making:    ED Course: Patient feels well after IV fluids tolerating p.o. will discharge home    Discussion of management with other healthcare providers:    Condition upon leaving the department: Stable          Medical Decision Making      Disposition and Plan     Clinical Impression:  1. Nausea vomiting and diarrhea         Disposition:  Discharge  3/31/2025  8:50  pm    Follow-up:  Nonstaff, Physician    Follow up      Sherri Soni MD  [address] [place] [id number]  [phone]    Follow up            Medications Prescribed:  Discharge Medication List as of 3/31/2025  8:57 PM        START taking these medications    Details   !! ondansetron 4 MG Oral Tablet Dispersible Take 1 tablet (4 mg total) by mouth every 4 (four) hours as needed for Nausea., Normal, Disp-10 tablet, R-0       !! - Potential duplicate medications found. Please discuss with provider.              Supplementary Documentation:

## 2025-03-31 NOTE — ED INITIAL ASSESSMENT (HPI)
PT to ED, steady gait, alert and orientated x4.   PT reporting nausea and vomiting since 3/21, denies blood in vomit, + sick contacts at school.

## 2025-04-01 NOTE — ED QUICK NOTES
This RN attempted to call pt to inform her about critical potassium result. Pt did not answer so left voicemail and call back number if needed.

## 2025-04-01 NOTE — ED QUICK NOTES
This RN called patient at 2207 regarding critical potassium of 2.8. Patient did not answer and this RN was unable to leave voicemail. This RN then called patient's emergency contact, no answer but a voicemail was left to call back.       Per MD Cabello, request that patient get OTC potassium and have labs redrawn

## 2025-04-01 NOTE — DISCHARGE INSTRUCTIONS
Return for any worsening or concern    Follow up with your physician or the one provided as instructed.      If you are having difficulty getting an appointment with physician, please notify the ED Case Manager at [phone].    Take medicines as prescribed

## 2025-04-03 ENCOUNTER — OFFICE VISIT (OUTPATIENT)
Dept: FAMILY MEDICINE CLINIC | Facility: CLINIC | Age: 20
End: 2025-04-03

## 2025-04-03 ENCOUNTER — LAB ENCOUNTER (OUTPATIENT)
Dept: LAB | Age: 20
End: 2025-04-03
Attending: NURSE PRACTITIONER
Payer: MEDICAID

## 2025-04-03 VITALS
BODY MASS INDEX: 25.64 KG/M2 | HEART RATE: 68 BPM | HEIGHT: 64 IN | WEIGHT: 150.19 LBS | SYSTOLIC BLOOD PRESSURE: 118 MMHG | DIASTOLIC BLOOD PRESSURE: 79 MMHG

## 2025-04-03 DIAGNOSIS — R11.10 INTRACTABLE VOMITING: Primary | ICD-10-CM

## 2025-04-03 DIAGNOSIS — E87.6 HYPOKALEMIA: ICD-10-CM

## 2025-04-03 LAB — POTASSIUM SERPL-SCNC: 3.3 MMOL/L (ref 3.5–5.1)

## 2025-04-03 PROCEDURE — 36415 COLL VENOUS BLD VENIPUNCTURE: CPT | Performed by: NURSE PRACTITIONER

## 2025-04-03 PROCEDURE — 84132 ASSAY OF SERUM POTASSIUM: CPT | Performed by: NURSE PRACTITIONER

## 2025-04-03 PROCEDURE — 3008F BODY MASS INDEX DOCD: CPT | Performed by: NURSE PRACTITIONER

## 2025-04-03 PROCEDURE — 3078F DIAST BP <80 MM HG: CPT | Performed by: NURSE PRACTITIONER

## 2025-04-03 PROCEDURE — 3074F SYST BP LT 130 MM HG: CPT | Performed by: NURSE PRACTITIONER

## 2025-04-03 PROCEDURE — 99213 OFFICE O/P EST LOW 20 MIN: CPT | Performed by: NURSE PRACTITIONER

## 2025-04-03 RX ORDER — POTASSIUM CHLORIDE 750 MG/1
20 TABLET, EXTENDED RELEASE ORAL DAILY
Qty: 28 TABLET | Refills: 0 | Status: SHIPPED | OUTPATIENT
Start: 2025-04-03 | End: 2025-04-17

## 2025-04-03 RX ORDER — BACLOFEN 10 MG/1
10 TABLET ORAL 3 TIMES DAILY
Qty: 30 TABLET | Refills: 0 | Status: SHIPPED | OUTPATIENT
Start: 2025-04-03

## 2025-04-03 NOTE — PROGRESS NOTES
HPI    Patient presents for ER follow up.  Seen on 3/31 for persistent vomiting and nausea.  Has been vomiting daily since 3/21.  Reports that Zofran is not helping with symptoms of nausea.    Was found to have a K level of 2.8 and discharged without treatment.  Patient continues to vomit.  Reports that she has vomited twice today.    Review of Systems   Gastrointestinal:  Positive for nausea and vomiting.        Vitals:    04/03/25 1626   BP: 118/79   Pulse: 68   Weight: 150 lb 3.2 oz (68.1 kg)   Height: 5' 4\" (1.626 m)     Body mass index is 25.78 kg/m².    Health Maintenance   Topic Date Due    Annual Physical  Never done    Meningococcal B Vaccine (2 of 2 - Bexsero SCDM 2-dose series) 01/02/2021    Pap Smear  Never done    DTaP,Tdap,and Td Vaccines (7 - Td or Tdap) 07/30/2024    COVID-19 Vaccine (1 - 2024-25 season) Never done    Annual Depression Screening  01/01/2025    Influenza Vaccine (Season Ended) 10/01/2025    Hepatitis B Vaccines  Completed    MMR Vaccines  Completed    Varicella Vaccines  Completed    Meningococcal Vaccine  Completed    HPV Vaccines  Completed    Pneumococcal Vaccine: Birth to 50yrs  Aged Out    Hepatitis A Vaccines  Aged Out       Past Medical History:    Pyelonephritis    Recurrent UTI    Scoliosis       .History reviewed. No pertinent surgical history.    History reviewed. No pertinent family history.    Social History     Socioeconomic History    Marital status: Single     Spouse name: Not on file    Number of children: Not on file    Years of education: Not on file    Highest education level: Not on file   Occupational History    Not on file   Tobacco Use    Smoking status: Never    Smokeless tobacco: Never   Vaping Use    Vaping status: Never Used   Substance and Sexual Activity    Alcohol use: Never    Drug use: Yes     Frequency: 3.0 times per week     Types: Cannabis    Sexual activity: Not on file   Other Topics Concern    Not on file   Social History Narrative    Not on file      Social Drivers of Health     Food Insecurity: No Food Insecurity (7/27/2024)    Food Insecurity     Food Insecurity: Never true   Transportation Needs: No Transportation Needs (7/27/2024)    Transportation Needs     Lack of Transportation: No     Car Seat: Not on file   Stress: Not on file   Housing Stability: Low Risk  (7/27/2024)    Housing Stability     Housing Instability: No     Housing Instability Emergency: Not on file     Crib or Bassinette: Not on file       Current Outpatient Medications   Medication Sig Dispense Refill    Potassium Chloride ER 10 MEQ Oral Tab CR Take 2 tablets (20 mEq total) by mouth daily for 14 days. 28 tablet 0    baclofen 10 MG Oral Tab Take 1 tablet (10 mg total) by mouth 3 (three) times daily. 30 tablet 0    metoclopramide 10 MG Oral Tab Take 1 tablet (10 mg total) by mouth 3 (three) times daily before meals. 30 tablet 0       Allergies:  Allergies[1]    Physical Exam  Vitals and nursing note reviewed.   Constitutional:       General: She is not in acute distress.     Appearance: Normal appearance.   Cardiovascular:      Rate and Rhythm: Normal rate and regular rhythm.      Heart sounds: Normal heart sounds.   Pulmonary:      Effort: Pulmonary effort is normal. No respiratory distress.      Breath sounds: Normal breath sounds. No stridor. No wheezing, rhonchi or rales.   Chest:      Chest wall: No tenderness.   Abdominal:      General: Abdomen is flat. Bowel sounds are normal. There is no distension.      Palpations: Abdomen is soft. There is no mass.      Tenderness: There is no abdominal tenderness. There is no guarding or rebound.      Hernia: No hernia is present.   Neurological:      Mental Status: She is alert and oriented to person, place, and time.          Assessment and Plan:   Problem List Items Addressed This Visit    None  Visit Diagnoses       Intractable vomiting    -  Primary    Relevant Medications    Potassium Chloride ER 10 MEQ Oral Tab CR    baclofen 10 MG  Oral Tab    metoclopramide 10 MG Oral Tab    Other Relevant Orders    Potassium [E] (Completed)    Gastro Referral - In Network    Hypokalemia        Relevant Medications    Potassium Chloride ER 10 MEQ Oral Tab CR    Other Relevant Orders    Potassium [E] (Completed)    Gastro Referral - In Network           Repeat K level today.  To start K-Dur 20 mEq once daily.  Trial of baclofen 3 times daily as needed.  If unable to tolerate p.o. potassium or Reglan/baclofen, return to ER for intractable vomiting.    Discussed plan of care with patient and patient is in agreement.  All questions answered. Patient to call with questions or concerns.    Encouraged to sign up for My Chart if not already registered.          [1]   Allergies  Allergen Reactions    Dog Fennel Allergy Skin Test ITCHING

## 2025-04-04 ENCOUNTER — TELEPHONE (OUTPATIENT)
Dept: FAMILY MEDICINE CLINIC | Facility: CLINIC | Age: 20
End: 2025-04-04

## 2025-04-04 RX ORDER — METOCLOPRAMIDE 10 MG/1
10 TABLET ORAL
Qty: 30 TABLET | Refills: 0 | Status: SHIPPED | OUTPATIENT
Start: 2025-04-04

## 2025-04-04 NOTE — TELEPHONE ENCOUNTER
Patient called states she received a call about 11 minutes ago and 1 minute after the first call, but she was not left a voicemail. She had recent lab draw yesterday and K was 3.3. I sent message to JOHNSON Champagne and she confirmed she did call patient and will call her back now. I made patient aware JOHNSON Champagne will call her shortly. Patient verbalized understanding. No further questions or concerns at this time.    No further action by RN Triage at this time.

## 2025-04-12 ENCOUNTER — LAB ENCOUNTER (OUTPATIENT)
Dept: LAB | Age: 20
End: 2025-04-12
Attending: NURSE PRACTITIONER
Payer: MEDICAID

## 2025-04-12 ENCOUNTER — OFFICE VISIT (OUTPATIENT)
Dept: FAMILY MEDICINE CLINIC | Facility: CLINIC | Age: 20
End: 2025-04-12

## 2025-04-12 VITALS
DIASTOLIC BLOOD PRESSURE: 72 MMHG | WEIGHT: 150 LBS | SYSTOLIC BLOOD PRESSURE: 114 MMHG | BODY MASS INDEX: 25.61 KG/M2 | HEART RATE: 67 BPM | HEIGHT: 64 IN

## 2025-04-12 DIAGNOSIS — E87.6 HYPOKALEMIA: Primary | ICD-10-CM

## 2025-04-12 LAB — POTASSIUM SERPL-SCNC: 3.9 MMOL/L (ref 3.5–5.1)

## 2025-04-12 PROCEDURE — 84132 ASSAY OF SERUM POTASSIUM: CPT | Performed by: NURSE PRACTITIONER

## 2025-04-12 PROCEDURE — 36415 COLL VENOUS BLD VENIPUNCTURE: CPT | Performed by: NURSE PRACTITIONER

## 2025-04-12 PROCEDURE — 99213 OFFICE O/P EST LOW 20 MIN: CPT | Performed by: NURSE PRACTITIONER

## 2025-04-12 NOTE — PROGRESS NOTES
HPI    Patient presents for follow-up of hypokalemia.  Reports resolution of nausea and vomiting.  Completed short-term course of potassium.  Eating and drinking normally again.    Review of Systems   Gastrointestinal:  Negative for nausea and vomiting.   All other systems reviewed and are negative.       Vitals:    04/12/25 1310   BP: 114/72   Pulse: 67   Weight: 150 lb (68 kg)   Height: 5' 4\" (1.626 m)     Body mass index is 25.75 kg/m².    Health Maintenance   Topic Date Due    Annual Physical  Never done    Meningococcal B Vaccine (2 of 2 - Bexsero SCDM 2-dose series) 01/02/2021    Pap Smear  Never done    DTaP,Tdap,and Td Vaccines (7 - Td or Tdap) 07/30/2024    COVID-19 Vaccine (1 - 2024-25 season) Never done    Annual Depression Screening  01/01/2025    Influenza Vaccine (Season Ended) 10/01/2025    Hepatitis B Vaccines  Completed    MMR Vaccines  Completed    Varicella Vaccines  Completed    Meningococcal Vaccine  Completed    HPV Vaccines  Completed    Pneumococcal Vaccine: Birth to 50yrs  Aged Out    Hepatitis A Vaccines  Aged Out       Past Medical History[1]    .Past Surgical History[2]    Family History[3]    Social History     Socioeconomic History    Marital status: Single     Spouse name: Not on file    Number of children: Not on file    Years of education: Not on file    Highest education level: Not on file   Occupational History    Not on file   Tobacco Use    Smoking status: Never    Smokeless tobacco: Never   Vaping Use    Vaping status: Never Used   Substance and Sexual Activity    Alcohol use: Never    Drug use: Yes     Frequency: 3.0 times per week     Types: Cannabis    Sexual activity: Not on file   Other Topics Concern    Not on file   Social History Narrative    Not on file     Social Drivers of Health     Food Insecurity: No Food Insecurity (7/27/2024)    Food Insecurity     Food Insecurity: Never true   Transportation Needs: No Transportation Needs (7/27/2024)    Transportation Needs      Lack of Transportation: No     Car Seat: Not on file   Stress: Not on file   Housing Stability: Low Risk  (7/27/2024)    Housing Stability     Housing Instability: No     Housing Instability Emergency: Not on file     Crib or Bassinette: Not on file       Current Medications[4]    Allergies:  Allergies[5]    Physical Exam  Vitals and nursing note reviewed.   Constitutional:       General: She is not in acute distress.     Appearance: Normal appearance.   Cardiovascular:      Rate and Rhythm: Normal rate and regular rhythm.      Heart sounds: Normal heart sounds.   Pulmonary:      Effort: Pulmonary effort is normal. No respiratory distress.      Breath sounds: Normal breath sounds. No stridor. No wheezing, rhonchi or rales.   Chest:      Chest wall: No tenderness.   Neurological:      Mental Status: She is alert and oriented to person, place, and time.          Assessment and Plan:   Problem List Items Addressed This Visit    None  Visit Diagnoses         Hypokalemia    -  Primary    Relevant Orders    Potassium [E]           Repeat potassium level today.    Discussed plan of care with patient and patient is in agreement.  All questions answered. Patient to call with questions or concerns.    Encouraged to sign up for My Chart if not already registered.        [1]   Past Medical History:   Pyelonephritis    Recurrent UTI    Scoliosis   [2] History reviewed. No pertinent surgical history.  [3] History reviewed. No pertinent family history.  [4]   No current outpatient medications on file.   [5]   Allergies  Allergen Reactions    Dog Fennel Allergy Skin Test ITCHING

## 2025-04-20 ENCOUNTER — HOSPITAL ENCOUNTER (EMERGENCY)
Facility: HOSPITAL | Age: 20
Discharge: HOME | End: 2025-04-20
Attending: EMERGENCY MEDICINE
Payer: MEDICAID

## 2025-04-20 VITALS
SYSTOLIC BLOOD PRESSURE: 126 MMHG | WEIGHT: 145 LBS | OXYGEN SATURATION: 100 % | DIASTOLIC BLOOD PRESSURE: 84 MMHG | TEMPERATURE: 98 F | HEART RATE: 78 BPM | RESPIRATION RATE: 17 BRPM | BODY MASS INDEX: 25.69 KG/M2 | HEIGHT: 63 IN

## 2025-04-20 DIAGNOSIS — N30.00 ACUTE CYSTITIS WITHOUT HEMATURIA: Primary | ICD-10-CM

## 2025-04-20 LAB
B-HCG UR QL: NEGATIVE
BILIRUB UR QL: NEGATIVE
COLOR UR: YELLOW
GLUCOSE UR-MCNC: NORMAL MG/DL
KETONES UR-MCNC: NEGATIVE MG/DL
LEUKOCYTE ESTERASE UR QL STRIP.AUTO: 500
NITRITE UR QL STRIP.AUTO: NEGATIVE
PH UR: 6 [PH] (ref 5–8)
PROT UR-MCNC: 100 MG/DL
RBC #/AREA URNS AUTO: >10 /HPF
SP GR UR STRIP: 1.02 (ref 1–1.03)
UROBILINOGEN UR STRIP-ACNC: NORMAL
WBC #/AREA URNS AUTO: >50 /HPF

## 2025-04-20 PROCEDURE — 87086 URINE CULTURE/COLONY COUNT: CPT | Performed by: EMERGENCY MEDICINE

## 2025-04-20 PROCEDURE — 87077 CULTURE AEROBIC IDENTIFY: CPT | Performed by: EMERGENCY MEDICINE

## 2025-04-20 PROCEDURE — 87086 URINE CULTURE/COLONY COUNT: CPT

## 2025-04-20 PROCEDURE — 87186 SC STD MICRODIL/AGAR DIL: CPT | Performed by: EMERGENCY MEDICINE

## 2025-04-20 PROCEDURE — 99283 EMERGENCY DEPT VISIT LOW MDM: CPT

## 2025-04-20 PROCEDURE — 99284 EMERGENCY DEPT VISIT MOD MDM: CPT

## 2025-04-20 PROCEDURE — 81001 URINALYSIS AUTO W/SCOPE: CPT

## 2025-04-20 PROCEDURE — 81025 URINE PREGNANCY TEST: CPT

## 2025-04-20 PROCEDURE — 81001 URINALYSIS AUTO W/SCOPE: CPT | Performed by: EMERGENCY MEDICINE

## 2025-04-20 RX ORDER — CEFDINIR 300 MG/1
300 CAPSULE ORAL 2 TIMES DAILY
Qty: 20 CAPSULE | Refills: 0 | Status: SHIPPED | OUTPATIENT
Start: 2025-04-20 | End: 2025-04-30

## 2025-04-20 NOTE — ED INITIAL ASSESSMENT (HPI)
Patient to the ED for complaint of urinary frequency and urine looks cloudy. Pt states hx of UTI. AO 4

## 2025-04-20 NOTE — ED PROVIDER NOTES
Patient Seen in: [facility] Emergency Department      History     Chief Complaint   Patient presents with    Urinary Symptoms     Stated Complaint: UTI    Subjective:   HPI    19-year-old female presents for evaluation for possible urinary tract infection.  Patient reports a history of recurrent urinary tract infections.  She states that last night she developed some frequency with urination, urgency and some discomfort consistent with previous UTIs.  She denies any fevers, chills, nausea, vomiting, abdominal pain, back pain or flank pain.  She reports that she took a dose of leftover cephalexin and that it seems to be helping her symptoms.  History of Present Illness               Objective:     Past Medical History:    Pyelonephritis    Recurrent UTI    Scoliosis              History reviewed. No pertinent surgical history.             Social History     Socioeconomic History    Marital status: Single   Tobacco Use    Smoking status: Never    Smokeless tobacco: Never   Vaping Use    Vaping status: Never Used   Substance and Sexual Activity    Alcohol use: Never    Drug use: Yes     Frequency: 3.0 times per week     Types: Cannabis     Social Drivers of Health     Food Insecurity: No Food Insecurity (7/27/2024)    Food Insecurity     Food Insecurity: Never true   Transportation Needs: No Transportation Needs (7/27/2024)    Transportation Needs     Lack of Transportation: No   Housing Stability: Low Risk  (7/27/2024)    Housing Stability     Housing Instability: No                                Physical Exam     ED Triage Vitals [04/20/25 0639]   /84   Pulse 78   Resp 17   Temp 98.1 °F (36.7 °C)   Temp src Oral   SpO2 100 %   O2 Device None (Room air)       Current Vitals:   Vital Signs  BP: 126/84  Pulse: 78  Resp: 17  Temp: 98.1 °F (36.7 °C)  Temp src: Oral    Oxygen Therapy  SpO2: 100 %  O2 Device: None (Room air)        Physical Exam  Vitals and nursing note reviewed.   Constitutional:        Appearance: Normal appearance. She is well-developed. She is not ill-appearing or diaphoretic.   HENT:      Head: Normocephalic and atraumatic.      Right Ear: External ear normal.      Left Ear: External ear normal.      Nose: Nose normal. No nasal deformity.      Mouth/Throat:      Lips: Pink.   Eyes:      General: Lids are normal.      Extraocular Movements: Extraocular movements intact.   Pulmonary:      Effort: Pulmonary effort is normal. No respiratory distress.   Abdominal:      General: Bowel sounds are normal.      Tenderness: There is no abdominal tenderness. There is no right CVA tenderness, left CVA tenderness, guarding or rebound.   Musculoskeletal:         General: No deformity. Normal range of motion.   Skin:     General: Skin is dry.      Coloration: Skin is not cyanotic or pale.   Neurological:      General: No focal deficit present.      Mental Status: She is alert and oriented to person, place, and time.      Gait: Gait is intact.   Psychiatric:         Attention and Perception: Attention normal.         Mood and Affect: Mood normal.         Speech: Speech normal.           Physical Exam                ED Course     Labs Reviewed   URINALYSIS WITH CULTURE REFLEX - Abnormal; Notable for the following components:       Result Value    Clarity Urine Ex.Turbid (*)     Blood Urine 3+ (*)     Protein Urine 100 (*)     Leukocyte Esterase Urine 500 (*)     WBC Urine >50 (*)     RBC Urine >10 (*)     Bacteria Urine Rare (*)     Squamous Epi. Cells Few (*)     Transitional Cells Few (*)     All other components within normal limits   POCT PREGNANCY URINE - Normal   URINE CULTURE, ROUTINE          Results                                 MDM              Medical Decision Making  Differential diagnosis includes but is not limited to UTI, pyelonephritis, etc    Exam without abdominal tenderness, appendicitis or diverticulitis unlikely.  There is no CVA tenderness to suggest pyelonephritis.  Urine is c/w UTI.   Past cultures reviewed and patient does have a history of ESBL.  There is no other resistance to cephalosporins in the past however she states that she is currently feeling better after having started the cephalexin last night.  She feels comfortable with continue with a cephalosporin at this time understanding that her urine culture may be resistant and once sensitivities have resulted antibiotic may need to be switched or she may need IV antibiotics.  She is otherwise nontoxic-appearing.  She is discharged home with a prescription for cefdinir.    Medical Record Review: I personally reviewed available prior medical records for any recent pertinent discharge summaries, testing, and procedures, and reviewed those reports.    Complicating factors: The patient  has a past medical history of Pyelonephritis, Recurrent UTI, and Scoliosis. and  has no past surgical history on file. that contribute to the medical complexity of this ED evaluation.     Clinical impression as well as any lab results and radiology findings were discussed with the patient and/or caregiver. I personally reviewed all laboratory results and radiology images myself. Patient is advised to follow up with PCP for reevaluation. I provided discharge instructions and return precautions. Patient and/or caregiver voices understanding and agreement with the treatment plan. All questions were addressed and answered.         Problems Addressed:  Acute cystitis without hematuria: acute illness or injury    Amount and/or Complexity of Data Reviewed  External Data Reviewed: labs.     Details: Urine culture from 10/27/24 reviewed and positive for ESBL  Labs: ordered. Decision-making details documented in ED Course.    Risk  Prescription drug management.        Disposition and Plan     Clinical Impression:  1. Acute cystitis without hematuria         Disposition:  Discharge  4/20/2025  8:52 am    Follow-up:  Kunal Springer MD  [address] [place] [address] [address]  [place] [address]  [phone]    Schedule an appointment as soon as possible for a visit  Primary care follow up if you don't have a PCP          Medications Prescribed:  Discharge Medication List as of 4/20/2025  9:25 AM        START taking these medications    Details   cefdinir 300 MG Oral Cap Take 1 capsule (300 mg total) by mouth 2 (two) times daily for 10 days., Normal, Disp-20 capsule, R-0             Supplementary Documentation:

## (undated) NOTE — LETTER
Date & Time: 10/20/2024, 7:14 AM  Patient: Alysa Espino  Encounter Provider(s):    Nabil Siegel MD       To Whom It May Concern:    Alysa Espino was seen and treated in our department on 10/20/2024. She should not return to work until 10/22/2024 .    If you have any questions or concerns, please do not hesitate to call.        _____________________________  Physician/APC Signature

## (undated) NOTE — LETTER
Date & Time: 7/25/2024, 8:37 AM  Patient: Alysa Espino  Encounter Provider(s):    Nilsa Baker MD       To Whom It May Concern:    Alysa Espino was seen and treated in our department on 7/25/2024. She should not return to work until 07/29/2024 .    If you have any questions or concerns, please do not hesitate to call.      Nilsa Baker MD  Physician/APC Signature

## (undated) NOTE — LETTER
Date & Time: 10/21/2022, 8:14 AM  Patient: Maik Benitez  Encounter Provider(s):    Jamie Del Angel MD       To Whom It May Concern:    Candace Rivera was seen and treated in our department on 10/21/2022. She should not return to work until 10/22/2022.     If you have any questions or concerns, please do not hesitate to call.        _____________________________  Physician/APC Signature

## (undated) NOTE — LETTER
5/14/2023          To Whom It May Concern:    Aidee Porter is currently under my medical care and may not return to work at this time. Please excuse Keysha Burgess for the date of 5/13/2023. She may return to work on 5/14/2023. Activity is restricted as follows: none. If you require additional information please contact our office.         Sincerely,    JOHNSON Chacko, FNP-BC              Document generated by:  JOHNSON Chacko

## (undated) NOTE — ED AVS SNAPSHOT
Felicity Deluna   MRN: [id number]    Department:  [facility] Emergency Department   Date of Visit:  10/17/2019           Disclosure     Insurance plans vary and the physician(s) referred by the ER may not be covered by your plan.  Please contact CARE PHYSICIAN AT ONCE OR RETURN IMMEDIATELY TO THE EMERGENCY DEPARTMENT. If you have been prescribed any medication(s), please fill your prescription right away and begin taking the medication(s) as directed.   If you believe that any of the medications

## (undated) NOTE — LETTER
January 25, 2017    Patient: Gi Vera   Date of Visit: 1/25/2017       To Whom It May Concern:    Gi Vera was seen and treated in our emergency department on 1/25/2017. She should not return to school until 01/30/17.     If you have any q

## (undated) NOTE — LETTER
10/21/19    Ant Salvador      To Whom It May Concern: This letter has been written at the patient's request. The above patient was seen at [facility] for treatment of a medical condition from 10/19/2019 through 10/21/2019.       Please excuse her

## (undated) NOTE — LETTER
Date & Time: 4/28/2023, 3:54 AM  Patient: Donny Grey  Encounter Provider(s):    Edman Lesches, MD       To Whom It May Concern:    Vanna Kern was seen and treated in our department on 4/28/2023. She should not return to work until 04/29/23 .     If you have any questions or concerns, please do not hesitate to call.        _____________________________  Physician/APC Signature

## (undated) NOTE — LETTER
November 7, 2017    Patient: Michael Navarrete   Date of Visit: 11/7/2017       To Whom It May Concern:    Michael Navarrete was seen and treated in our emergency department on 11/7/2017. She may return to school on 11/9/2017.   Patient has required to use

## (undated) NOTE — ED AVS SNAPSHOT
[facility] Emergency Department    [address] [address]    Phone:  [phone]    Fax:  [phone]           Bennet Dakins   MRN: [id number]    Department:  [facility] Emergency Department   Date of Visit:  1/25 and Class Registration line at [phone] or find a doctor online by visiting [email or web address].    IF THERE IS ANY CHANGE OR WORSENING OF YOUR CONDITION, CALL YOUR PRIMARY CARE PHYSICIAN AT ONCE OR RETURN IMMEDIATELY TO [address] [place] [address].     If

## (undated) NOTE — LETTER
Date & Time: 6/2/2023, 2:55 PM  Patient: Queenie Points  Encounter Provider(s):    Supa Jimenez MD       To Whom It May Concern:    Ronald Samuels was seen and treated in our department on 6/2/2023. She can return to work on 6/5/2023.     If you have any questions or concerns, please do not hesitate to call.        _____________________________  Physician/APC Signature

## (undated) NOTE — ED AVS SNAPSHOT
[facility] Emergency Department    [address]     [place] [address]    Phone:  [phone]    Fax:  [phone]           Pauline Reece   MRN: [id number]    Department:  [facility] Emergency Department   Date of Visit:  5/16 coverage and benefits available for follow-up care and referrals. If you have difficulty scheduling your follow-up appointment as directed, please call our  at [phone].      Si tiene problemas para programar lidia rodríguez de seguimiento s different from what your Primary Care doctor has instructed you - please continue to take your medications as instructed by your Primary Care doctor until you can check with your doctor. Please bring the medication list to your next doctor's appointment. can help with your Affordable Care Act coverage, as well as all types of Medicaid plans. To get signed up and covered, please call [phone] and ask to get set up for an insurance coverage that is in-network with [facility] [employer]

## (undated) NOTE — LETTER
Date & Time: 3/31/2025, 8:50 PM  Patient: Alysa Espino  Encounter Provider(s):    Coleman Bhatia MD       To Whom It May Concern:    Alysa Espino was seen and treated in our department on 3/31/2025. She should be excused from school for the next few days.    If you have any questions or concerns, please do not hesitate to call.      BIRD Bhatia MD  _____________________________  Physician/APC Signature

## (undated) NOTE — LETTER
Date & Time: 9/10/2022, 1:39 AM  Patient: Jose F Loo  Encounter Provider(s):    MD Tatianna Ordonez APRN       To Whom It May Concern:    Cass Nolen was seen and treated in our department on 9/9/2022. She should not return to work until 09/12/2022.     If you have any questions or concerns, please do not hesitate to call.        _____________________________  Physician/APC Signature

## (undated) NOTE — LETTER
To Whom It May Concern:  This certifies that Alysa Espino was admitted to [facility] from 7/26-7/28/24. Please excuse her absence. Do not hesitate to call with any questions or concerns.        Sincerely,    Diogo Vega MD  [facility] HOSPITALIST  [address] [facility] [address]  [phone]        Document generated by: Diogo Vega MD

## (undated) NOTE — ED AVS SNAPSHOT
[facility] Emergency Department    [address] [address]    Phone:  [phone]    Fax:  [phone]           Cornelio Maldonado   MRN: [id number]    Department:  [facility] Emergency Department   Date of Visit:  4/6/ service to you, we would appreciate any positive or negative feedback related to the care you received in our emergency department. Please call our [address] [facility] at [phone]. Your Emergency Department team is here to serve you.   You are our top priori I certified that I have received a copy of the aftercare instructions; that these instructions have been explained to me; all questions pertaining to these instructions have been answered in a satisfactory manner.         [email or web address] Proxy Access to your child’s MyChart go to [email or web address] and click on the   Sign Up Forms link in the Additional Information box on the right. MyChart Questions? Call [phone] for help.   MyChart is NOT to be used for urgent needs

## (undated) NOTE — ED AVS SNAPSHOT
[facility] Emergency Department    [address]     [place] [address]    Phone:  [phone]    Fax:  [phone]           Carmine Grimaldo   MRN: [id number]    Department:  [facility] Emergency Department   Date of Visit:  5/16 and Class Registration line at [phone] or find a doctor online by visiting [email or web address].    IF THERE IS ANY CHANGE OR WORSENING OF YOUR CONDITION, CALL YOUR PRIMARY CARE PHYSICIAN AT ONCE OR RETURN IMMEDIATELY TO [address] [place] [address].     If

## (undated) NOTE — LETTER
Date & Time: 10/27/2024, 10:13 PM  Patient: Alysa Espino  Encounter Provider(s):    Landon Ludwig MD       To Whom It May Concern:    Alysa Espino was seen and treated in our department on 10/27/2024. She should not return to work until 10/29/24 .    If you have any questions or concerns, please do not hesitate to call.        _____________________________  Physician/APC Signature

## (undated) NOTE — ED AVS SNAPSHOT
[facility] Emergency Department    [place]  [address] [address]    Phone:  [phone]    Fax:  [phone]           Alok Galicia   MRN: [id number]    Department:  [facility] Emergency Department   Date of Visit:  1/22 Pediatric [id number] Emergency Department   [phone]       To Check ER Wait Times:  TEXT 'ERwait' to 87678      Click [email or web address]      Or call [phone]    If you have any problems with your follow-up, please call our case [facility] before you leave. After you leave, you should follow the attached instructions. I have read and understand the instructions given to me by my caregivers. [facility] Pharm[facility]        Pharmacy Address Phone Number   [facility] [address] Final result    Impression:    CONCLUSION:  No acute disease. Dictated by: Rylie Barlow MD on 1/22/2017 at 18:51       Approved by:  Rylie Barlow MD              Narrative:    PROCEDURE:  XR CHEST PA + LAT CHEST (CPT=71020)     INDICATIONS:  Maren Nuñez

## (undated) NOTE — ED AVS SNAPSHOT
Migdalia Farmer   MRN: [id number]    Department:  [facility] Emergency Department   Date of Visit:  2/19/2020           Disclosure     Insurance plans vary and the physician(s) referred by the ER may not be covered by your plan.  Please contact y tell this physician (or your personal doctor if your instructions are to return to your personal doctor) about any new or lasting problems. The primary care or specialist physician will see patients referred from the [facility] Emergency Department.  Campbell Hollingsworth

## (undated) NOTE — LETTER
Date & Time: 11/14/2018, 4:54 PM  Patient: Leo Paul  Encounter Provider(s):    NAEL Mclean       To Whom It May Concern:    Leo Paul was seen and treated in our department on 11/14/2018. She can return to school 11/15/2018.     If

## (undated) NOTE — ED AVS SNAPSHOT
[facility] Emergency Department    [place]  [address] [address]    Phone:  [phone]    Fax:  [phone]           Amita Morris   MRN: [id number]    Department:  [facility] Emergency Department   Date of Visit:  1/22 IF THERE IS ANY CHANGE OR WORSENING OF YOUR CONDITION, CALL YOUR PRIMARY CARE PHYSICIAN AT ONCE OR RETURN IMMEDIATELY TO THE EMERGENCY DEPARTMENT.     If you have been prescribed any medication(s), please fill your prescription right away and begin taking t

## (undated) NOTE — ED AVS SNAPSHOT
Brett Asher   MRN: [id number]    Department:  [facility] Emergency Department   Date of Visit:  9/1/2019           Disclosure     Insurance plans vary and the physician(s) referred by the ER may not be covered by your plan.  Please contact y CARE PHYSICIAN AT ONCE OR RETURN IMMEDIATELY TO THE EMERGENCY DEPARTMENT. If you have been prescribed any medication(s), please fill your prescription right away and begin taking the medication(s) as directed.   If you believe that any of the medications

## (undated) NOTE — LETTER
Date & Time: 7/25/2024, 8:32 AM  Patient: Alysa Espino  Encounter Provider(s):    Nilsa Baker MD       To Whom It May Concern:    Alysa Espino was seen and treated in our department on 7/25/2024. She should not return to work until 07/29/2024 .    If you have any questions or concerns, please do not hesitate to call.        _____________________________  Physician/APC Signature

## (undated) NOTE — LETTER
Date & Time: 9/2/2023, 3:03 AM  Patient: Abeba Richards  Encounter Provider(s): Radha Morales MD       To Whom It May Concern:    Alicia Lawton was seen and treated in our department on 9/1/2023. She should not return to work until 9/3/2023 .     If you have any questions or concerns, please do not hesitate to call.        _____________________________  Physician/APC Signature

## (undated) NOTE — ED AVS SNAPSHOT
Aide Melvin   MRN: [id number]    Department:  [facility] Emergency Department   Date of Visit:  11/14/2018           Disclosure     Insurance plans vary and the physician(s) referred by the ER may not be covered by your plan.  Please contact CARE PHYSICIAN AT ONCE OR RETURN IMMEDIATELY TO THE EMERGENCY DEPARTMENT. If you have been prescribed any medication(s), please fill your prescription right away and begin taking the medication(s) as directed.   If you believe that any of the medications

## (undated) NOTE — LETTER
VACCINE ADMINISTRATION RECORD  PARENT / GUARDIAN APPROVAL  Date: 2/10/2020  Vaccine administered to: Aracelis Jones     : 2005    MRN: [id number]    A copy of the appropriate Centers for Disease Control and Prevention Vaccine Information stateme

## (undated) NOTE — ED AVS SNAPSHOT
Dontae Valdes   MRN: [id number]    Department:  [facility] Emergency Department   Date of Visit:  2/1/2020           Disclosure     Insurance plans vary and the physician(s) referred by the ER may not be covered by your plan.  Please contact yo tell this physician (or your personal doctor if your instructions are to return to your personal doctor) about any new or lasting problems. The primary care or specialist physician will see patients referred from the [facility] Emergency Department.  Sp Navarro

## (undated) NOTE — ED AVS SNAPSHOT
Alok Galicia   MRN: [id number]    Department:  [facility] Emergency Department   Date of Visit:  11/7/2017           Disclosure     Insurance plans vary and the physician(s) referred by the ER may not be covered by your plan.  Please contact CARE PHYSICIAN AT ONCE OR RETURN IMMEDIATELY TO THE EMERGENCY DEPARTMENT. If you have been prescribed any medication(s), please fill your prescription right away and begin taking the medication(s) as directed.   If you believe that any of the medications

## (undated) NOTE — LETTER
Date & Time: 10/22/2024, 6:23 AM  Patient: Alysa Espino  Encounter Provider(s):    Nabil Siegel MD       To Whom It May Concern:    Alysa Espino was seen and treated in our department on 10/20/2024. She should not return to work until 10- .    If you have any questions or concerns, please do not hesitate to call.        _____________________________  Physician/APC Signature

## (undated) NOTE — ED AVS SNAPSHOT
[facility] Emergency Department    [place] [address]    Phone:  [phone]    Fax:  [phone]           Leo Paul   MRN: [id number]    Department:  [facility] Emergency Department   Date of Visit:  1/25 Will call if flu swab positive  Increase fluid intake. If pain moved to right lower side of abdomen return to ER immediately  Recheck with pediatrician in 1-2 days.  Return to ER if symptoms worsen in any way    Discharge References/Attachments     FE[facility]LE and Class Registration line at [phone] or find a doctor online by visiting [email or web address].    IF THERE IS ANY CHANGE OR WORSENING OF YOUR CONDITION, CALL YOUR PRIMARY CARE PHYSICIAN AT ONCE OR RETURN IMMEDIATELY TO [address] [place] [address].     If harming yourself, contact [facility] at [phone]. - If you don’t have insurance, Inés Ryan has partnered with [facility] [address] to help you get signed up for insurance coverage.   [facility] [place]

## (undated) NOTE — ED AVS SNAPSHOT
Linsey Jimenez   MRN: [id number]    Department:  [facility] Emergency Department   Date of Visit:  2/5/2019           Disclosure     Insurance plans vary and the physician(s) referred by the ER may not be covered by your plan.  Please contact y CARE PHYSICIAN AT ONCE OR RETURN IMMEDIATELY TO THE EMERGENCY DEPARTMENT. If you have been prescribed any medication(s), please fill your prescription right away and begin taking the medication(s) as directed.   If you believe that any of the medications

## (undated) NOTE — LETTER
Date & Time: 10/22/2022, 2:14 PM  Patient: Ritu Rivera  Encounter Provider(s):    Vince Barbosa DO       To Whom It May Concern:    Katerin Palomino was seen and treated in our department on 10/22/2022. She should not return to work until 10/24/22.     If you have any questions or concerns, please do not hesitate to call.        _____________________________  Physician/APC Signature

## (undated) NOTE — LETTER
VACCINE ADMINISTRATION RECORD  PARENT / GUARDIAN APPROVAL  Date: 2020  Vaccine administered to: Gwyn Carvalho     : 2005    MRN: [id number]    A copy of the appropriate Centers for Disease Control and Prevention Vaccine Information statemen

## (undated) NOTE — ED AVS SNAPSHOT
[facility] Emergency Department    [place] [address]     [address] [address]    Phone:  [phone]    Fax:  [phone]           Cornelio Maldonado   MRN: [id number]    Department:  [facility] Emergency Department   Date of Visit:  4/6/ and Class Registration line at [phone] or find a doctor online by visiting [email or web address].    IF THERE IS ANY CHANGE OR WORSENING OF YOUR CONDITION, CALL YOUR PRIMARY CARE PHYSICIAN AT ONCE OR RETURN IMMEDIATELY TO [address] [place] [address].     If

## (undated) NOTE — LETTER
Date & Time: 11/4/2024, 8:24 AM  Patient: Lionelesthela IBRAHIM Srinivas    Dear Alysa Espino,    After numerous attempts, we have been unable to contact you via telephone. We are trying to reach you to review new test results and discuss an update in your treatment plan.        Please contact the Emergency Department charge nurse at [phone].    Sincerely,    [facility]

## (undated) NOTE — LETTER
May 16, 2017    Patient: Manual Feast   Date of Visit: 5/16/2017       To Whom It May Concern:    Weston Madrid was seen and treated in our emergency department on 5/16/2017.      If you have any questions or concerns, please don't hesitate to call

## (undated) NOTE — LETTER
2/10/2020          To Whom It May Concern:    Ardyth Fleet is currently under my medical care and may not return to school at this time. Please excuse Jose Rae for 1 day. She may return to school on 2/11/20.   Activity is restricted as follows: non

## (undated) NOTE — LETTER
Date: 10/21/2019    Patient Name: Felicity Deluna          To Whom it may concern:     This letter has been written at the patient's request. The above patient was seen at the [facility] for treatment of a medical condition and was inpatient f

## (undated) NOTE — ED AVS SNAPSHOT
Jennifer Seanarlin   MRN: [id number]    Department:  [facility] Emergency Department   Date of Visit:  12/31/2018           Disclosure     Insurance plans vary and the physician(s) referred by the ER may not be covered by your plan.  Please contact CARE PHYSICIAN AT ONCE OR RETURN IMMEDIATELY TO THE EMERGENCY DEPARTMENT. If you have been prescribed any medication(s), please fill your prescription right away and begin taking the medication(s) as directed.   If you believe that any of the medications